# Patient Record
Sex: FEMALE | Race: WHITE | NOT HISPANIC OR LATINO | Employment: FULL TIME | URBAN - METROPOLITAN AREA
[De-identification: names, ages, dates, MRNs, and addresses within clinical notes are randomized per-mention and may not be internally consistent; named-entity substitution may affect disease eponyms.]

---

## 2017-08-09 ENCOUNTER — HOSPITAL ENCOUNTER (OUTPATIENT)
Dept: GENERAL RADIOLOGY | Facility: HOSPITAL | Age: 57
Discharge: HOME | End: 2017-08-09
Admitting: NURSE PRACTITIONER

## 2017-08-09 ENCOUNTER — OFFICE VISIT (OUTPATIENT)
Dept: INTERNAL MEDICINE | Facility: CLINIC | Age: 57
End: 2017-08-09

## 2017-08-09 VITALS
WEIGHT: 171 LBS | DIASTOLIC BLOOD PRESSURE: 82 MMHG | BODY MASS INDEX: 32.28 KG/M2 | HEART RATE: 89 BPM | SYSTOLIC BLOOD PRESSURE: 124 MMHG | OXYGEN SATURATION: 97 % | HEIGHT: 61 IN

## 2017-08-09 DIAGNOSIS — M54.12 CERVICAL RADICULOPATHY: ICD-10-CM

## 2017-08-09 DIAGNOSIS — M77.8 TENDONITIS OF ELBOW, LEFT: ICD-10-CM

## 2017-08-09 DIAGNOSIS — M54.2 CERVICAL PAIN (NECK): Primary | ICD-10-CM

## 2017-08-09 DIAGNOSIS — E78.5 HYPERLIPIDEMIA, UNSPECIFIED HYPERLIPIDEMIA TYPE: ICD-10-CM

## 2017-08-09 DIAGNOSIS — M50.30 DEGENERATIVE CERVICAL DISC: Primary | ICD-10-CM

## 2017-08-09 DIAGNOSIS — M54.2 CERVICAL PAIN (NECK): ICD-10-CM

## 2017-08-09 LAB
ALBUMIN SERPL-MCNC: 4.8 G/DL (ref 3.5–5.2)
ALBUMIN/GLOB SERPL: 2 G/DL
ALP SERPL-CCNC: 70 U/L (ref 40–129)
ALT SERPL-CCNC: 19 U/L (ref 5–33)
AST SERPL-CCNC: 28 U/L (ref 5–32)
BASOPHILS # BLD AUTO: 0.03 10*3/MM3 (ref 0–0.2)
BASOPHILS NFR BLD AUTO: 0.6 % (ref 0–2)
BILIRUB SERPL-MCNC: 0.4 MG/DL (ref 0.2–1.2)
BUN SERPL-MCNC: 9 MG/DL (ref 6–20)
BUN/CREAT SERPL: 15.3 (ref 7–25)
CALCIUM SERPL-MCNC: 9.9 MG/DL (ref 8.6–10.5)
CHLORIDE SERPL-SCNC: 102 MMOL/L (ref 98–107)
CHOLEST SERPL-MCNC: 193 MG/DL (ref 0–200)
CHOLEST/HDLC SERPL: 2.08 {RATIO}
CO2 SERPL-SCNC: 25.5 MMOL/L (ref 22–29)
CREAT SERPL-MCNC: 0.59 MG/DL (ref 0.57–1)
EOSINOPHIL # BLD AUTO: 0.35 10*3/MM3 (ref 0.1–0.3)
EOSINOPHIL NFR BLD AUTO: 6.5 % (ref 0–4)
ERYTHROCYTE [DISTWIDTH] IN BLOOD BY AUTOMATED COUNT: 12.5 % (ref 11.5–14.5)
GLOBULIN SER CALC-MCNC: 2.4 GM/DL
GLUCOSE SERPL-MCNC: 101 MG/DL (ref 65–99)
HCT VFR BLD AUTO: 41.5 % (ref 37–47)
HDLC SERPL-MCNC: 93 MG/DL (ref 40–60)
HGB BLD-MCNC: 13.7 G/DL (ref 12–16)
IMM GRANULOCYTES # BLD: 0.01 10*3/MM3 (ref 0–0.03)
IMM GRANULOCYTES NFR BLD: 0.2 % (ref 0–0.5)
LDLC SERPL CALC-MCNC: 89 MG/DL (ref 0–100)
LYMPHOCYTES # BLD AUTO: 1.56 10*3/MM3 (ref 0.6–4.8)
LYMPHOCYTES NFR BLD AUTO: 28.8 % (ref 20–45)
MCH RBC QN AUTO: 31.5 PG (ref 27–31)
MCHC RBC AUTO-ENTMCNC: 33 G/DL (ref 31–37)
MCV RBC AUTO: 95.4 FL (ref 81–99)
MONOCYTES # BLD AUTO: 0.46 10*3/MM3 (ref 0–1)
MONOCYTES NFR BLD AUTO: 8.5 % (ref 3–8)
NEUTROPHILS # BLD AUTO: 3 10*3/MM3 (ref 1.5–8.3)
NEUTROPHILS NFR BLD AUTO: 55.4 % (ref 45–70)
NRBC BLD AUTO-RTO: 0 /100 WBC (ref 0–0)
PLATELET # BLD AUTO: 333 10*3/MM3 (ref 140–500)
POTASSIUM SERPL-SCNC: 4.3 MMOL/L (ref 3.5–5.2)
PROT SERPL-MCNC: 7.2 G/DL (ref 6–8.5)
RBC # BLD AUTO: 4.35 10*6/MM3 (ref 4.2–5.4)
SODIUM SERPL-SCNC: 140 MMOL/L (ref 136–145)
T4 SERPL-MCNC: 6.83 MCG/DL (ref 4.5–11.7)
TRIGL SERPL-MCNC: 56 MG/DL (ref 0–150)
TSH SERPL DL<=0.005 MIU/L-ACNC: 1.63 MIU/ML (ref 0.27–4.2)
VIT B12 SERPL-MCNC: 1526 PG/ML (ref 211–946)
VLDLC SERPL CALC-MCNC: 11.2 MG/DL (ref 7–27)
WBC # BLD AUTO: 5.41 10*3/MM3 (ref 4.8–10.8)

## 2017-08-09 PROCEDURE — 99214 OFFICE O/P EST MOD 30 MIN: CPT | Performed by: NURSE PRACTITIONER

## 2017-08-09 PROCEDURE — 72040 X-RAY EXAM NECK SPINE 2-3 VW: CPT

## 2017-08-09 RX ORDER — MELOXICAM 15 MG/1
15 TABLET ORAL DAILY
Qty: 30 TABLET | Refills: 1 | Status: SHIPPED | OUTPATIENT
Start: 2017-08-09 | End: 2018-03-30

## 2017-08-09 NOTE — PROGRESS NOTES
"Chief Complaint   Patient presents with   • Arm Pain       Subjective   Kellen Henriquez is a 56 y.o. female is being seen for an acute appointment for right arm pain for 2 years. Started with right arm numbness at night that has progressively worsened. Now, with aching pain, \"like on fire and burning.\" Pain starts in neck and radiates into first 3 fingers. Numbness is  Worse after working, hand dangling. She is banding steel coils at work, and she operates hand machines.  Better is walking around, and shaking hand. She is not taking anything.     She has a history of tendonitis in both elbows, and she also has had cortisone injections in the past. She is having left elbow pain again since starting about 1 week ago. She would like an injection.      She also has elevated cholesterol and is fasting today.     History of Present Illness     Current Outpatient Prescriptions on File Prior to Visit   Medication Sig Dispense Refill   • aspirin 325 MG tablet Take by mouth.     • Garcinia Cambogia-Chromium 500-200 MG-MCG tablet Take by mouth.     • hydrocortisone (ANUSOL-HC) 2.5 % rectal cream 2 (two) times a day.     • meloxicam (MOBIC) 15 MG tablet Take 1 tablet (15 mg total) by mouth daily. 30 tablet 1   • tolterodine LA (DETROL LA) 4 MG 24 hr capsule Take 1 capsule (4 mg total) by mouth daily. 30 capsule 11     No current facility-administered medications on file prior to visit.        The following portions of the patient's history were reviewed and updated as appropriate: allergies, current medications, past family history, past medical history, past social history, past surgical history and problem list.    Review of Systems   Constitutional: Negative.    HENT: Negative.    Eyes: Negative.    Respiratory: Negative.  Negative for apnea and chest tightness.    Cardiovascular: Negative.  Negative for chest pain and leg swelling.   Gastrointestinal: Negative.  Negative for abdominal distention and abdominal pain. "   Endocrine: Negative.    Musculoskeletal: Positive for arthralgias, back pain, gait problem, myalgias and neck pain.   Allergic/Immunologic: Negative.  Negative for environmental allergies and food allergies.   Neurological: Positive for numbness.   Hematological: Negative.    Psychiatric/Behavioral: Negative.  Negative for agitation and behavioral problems.       Objective   Physical Exam   Constitutional: She is oriented to person, place, and time. She appears well-developed and well-nourished.   HENT:   Head: Normocephalic.   Right Ear: External ear normal.   Left Ear: External ear normal.   Neck: Neck supple.   Cardiovascular: Normal rate, regular rhythm and normal heart sounds.    No murmur heard.  Pulmonary/Chest: Effort normal and breath sounds normal. No respiratory distress.   Musculoskeletal: She exhibits no edema.        Right wrist: She exhibits normal range of motion and no tenderness.        Cervical back: She exhibits decreased range of motion, tenderness (C5-6) and pain (In a C3-4 distribtution). She exhibits no swelling, no edema and no deformity.        Left forearm: She exhibits tenderness (lateral epicondyle).   Negative Tinel's and Phalen signs. Radial pulse palpable wit normal cap refill< 3 secs.    Neurological: She is alert and oriented to person, place, and time.   Skin: No erythema.   Psychiatric: She has a normal mood and affect. Her behavior is normal.   Vitals reviewed.      Assessment/Plan   Kellen was seen today for arm pain.    Diagnoses and all orders for this visit:    Cervical pain (neck)  -     XR Spine Cervical 2 or 3 View; Future  -     meloxicam (MOBIC) 15 MG tablet; Take 1 tablet by mouth Daily.    Cervical radiculopathy  -     XR Spine Cervical 2 or 3 View; Future  -     meloxicam (MOBIC) 15 MG tablet; Take 1 tablet by mouth Daily.    Tendonitis of elbow, left  -     Ambulatory Referral to Orthopedic Surgery       Diagnosis Plan   1. Cervical pain (neck)  XR Spine Cervical 2  or 3 View    meloxicam (MOBIC) 15 MG tablet   2. Cervical radiculopathy  XR Spine Cervical 2 or 3 View    meloxicam (MOBIC) 15 MG tablet    CBC & Differential    Vitamin B12   3. Tendonitis of elbow, left  Ambulatory Referral to Orthopedic Surgery   4. Hyperlipidemia, unspecified hyperlipidemia type  Comprehensive metabolic panel    Conv Lipid Panel w/ Chol/HDL Ratio    CBC & Differential    T4 & TSH ([facility])     It appears to be cervical region of radiculopathy from repetitive movement at work. Will eval for Deg disc in cervical area. I have asked her to wear a wrist brace at night.

## 2017-08-11 ENCOUNTER — TELEPHONE (OUTPATIENT)
Dept: INTERNAL MEDICINE | Facility: CLINIC | Age: 57
End: 2017-08-11

## 2017-08-11 NOTE — TELEPHONE ENCOUNTER
----- Message from HECTOR Stockton sent at 8/10/2017  8:12 AM EDT -----  Labs are doing well. B12 is high range. Glucose is 1 point above normal, so monitor sweets in diet. Cholesterol has improved since last check.   ----- Message -----     From: Interface, Reflab Results In     Sent: 8/9/2017   4:17 PM       To: HECTOR Stockton

## 2017-11-02 ENCOUNTER — TRANSCRIBE ORDERS (OUTPATIENT)
Dept: ADMINISTRATIVE | Facility: HOSPITAL | Age: 57
End: 2017-11-02

## 2017-11-02 DIAGNOSIS — Z12.31 VISIT FOR SCREENING MAMMOGRAM: Primary | ICD-10-CM

## 2017-11-20 ENCOUNTER — HOSPITAL ENCOUNTER (OUTPATIENT)
Dept: MAMMOGRAPHY | Facility: HOSPITAL | Age: 57
Discharge: HOME | End: 2017-11-20
Admitting: NURSE PRACTITIONER

## 2017-11-20 ENCOUNTER — PROCEDURE VISIT (OUTPATIENT)
Dept: INTERNAL MEDICINE | Facility: CLINIC | Age: 57
End: 2017-11-20

## 2017-11-20 VITALS
TEMPERATURE: 98.2 F | BODY MASS INDEX: 31.34 KG/M2 | DIASTOLIC BLOOD PRESSURE: 88 MMHG | WEIGHT: 166 LBS | OXYGEN SATURATION: 95 % | RESPIRATION RATE: 16 BRPM | HEIGHT: 61 IN | HEART RATE: 77 BPM | SYSTOLIC BLOOD PRESSURE: 130 MMHG

## 2017-11-20 DIAGNOSIS — R82.998 LEUKOCYTES IN URINE: ICD-10-CM

## 2017-11-20 DIAGNOSIS — R92.8 ABNORMAL MAMMOGRAM OF RIGHT BREAST: ICD-10-CM

## 2017-11-20 DIAGNOSIS — E55.9 VITAMIN D DEFICIENCY: ICD-10-CM

## 2017-11-20 DIAGNOSIS — Z23 FLU VACCINE NEED: ICD-10-CM

## 2017-11-20 DIAGNOSIS — Z00.00 HEALTHCARE MAINTENANCE: ICD-10-CM

## 2017-11-20 DIAGNOSIS — K64.4 EXTERNAL HEMORRHOIDS: ICD-10-CM

## 2017-11-20 DIAGNOSIS — Z86.19 HISTORY OF HPV INFECTION: ICD-10-CM

## 2017-11-20 DIAGNOSIS — Z12.83 SKIN CANCER SCREENING: ICD-10-CM

## 2017-11-20 DIAGNOSIS — N31.8 HYPERTONICITY OF BLADDER: Primary | ICD-10-CM

## 2017-11-20 DIAGNOSIS — M50.30 DEGENERATIVE CERVICAL DISC: ICD-10-CM

## 2017-11-20 DIAGNOSIS — Z12.31 VISIT FOR SCREENING MAMMOGRAM: ICD-10-CM

## 2017-11-20 DIAGNOSIS — Z11.59 NEED FOR HEPATITIS C SCREENING TEST: ICD-10-CM

## 2017-11-20 PROBLEM — N81.10 FEMALE CYSTOCELE: Status: ACTIVE | Noted: 2017-11-20

## 2017-11-20 PROBLEM — M54.2 CERVICAL PAIN (NECK): Status: RESOLVED | Noted: 2017-08-09 | Resolved: 2017-11-20

## 2017-11-20 LAB
BILIRUB BLD-MCNC: NEGATIVE MG/DL
CLARITY, POC: ABNORMAL
COLOR UR: YELLOW
GLUCOSE UR STRIP-MCNC: NEGATIVE MG/DL
KETONES UR QL: NEGATIVE
LEUKOCYTE EST, POC: ABNORMAL
NITRITE UR-MCNC: NEGATIVE MG/ML
PH UR: 5.5 [PH] (ref 5–8)
PROT UR STRIP-MCNC: NEGATIVE MG/DL
RBC # UR STRIP: ABNORMAL /UL
SP GR UR: 1 (ref 1–1.03)
UROBILINOGEN UR QL: NORMAL

## 2017-11-20 PROCEDURE — G0202 SCR MAMMO BI INCL CAD: HCPCS

## 2017-11-20 PROCEDURE — 99396 PREV VISIT EST AGE 40-64: CPT | Performed by: NURSE PRACTITIONER

## 2017-11-20 PROCEDURE — 90471 IMMUNIZATION ADMIN: CPT | Performed by: NURSE PRACTITIONER

## 2017-11-20 PROCEDURE — 77063 BREAST TOMOSYNTHESIS BI: CPT

## 2017-11-20 PROCEDURE — 90686 IIV4 VACC NO PRSV 0.5 ML IM: CPT | Performed by: NURSE PRACTITIONER

## 2017-11-20 NOTE — PROGRESS NOTES
"Gynecologic Exam (c/o prolaspe symptoms) and Hand Pain        Kellen Henriquez is being seen for a Complete physical exam. Her last physical was 12-2-2015. She is  and works full time in [employer]. She has health history of OAB, hemorrhoids, and vitamin D Def. Colonoscopy was  completed 8-9-2013. Last labs reviewed from 8-9-2017. She exercises 0 times a week. She does not/never used tobacco. She drinks no alcoholic drinks per week.     Reproductive Health: Her Last Pap smear was 12-2-2015. LMP was age 52 years old.   DEXA scan complete never. Last Mammogram was today, and she will need an US.    She is complaining of right hand numbness and tingling for 6 months. She proceeded to see ortho and go to PT. The PT would not help, and so she got an MRI ordered by Richard Carcamo. Pain mostly at night in right hand , rated a \"20\" of 10. Worse with driving or hand above head.     She is also complanioning of pressure in vaginal area of and on with urge incontinence.     History of Present Illness     The following portions of the patient's history were reviewed and updated as appropriate: allergies, current medications, past family history, past medical history, past social history, past surgical history and problem list.    Review of Systems   Constitutional: Negative.    HENT: Negative for congestion and dental problem.    Eyes: Negative.    Respiratory: Negative.    Cardiovascular: Negative.  Negative for chest pain, palpitations and leg swelling.   Gastrointestinal: Negative.  Negative for abdominal distention and abdominal pain.   Endocrine: Negative.    Genitourinary: Positive for frequency, pelvic pain and urgency. Negative for difficulty urinating, enuresis and flank pain.   Musculoskeletal: Positive for arthralgias, back pain, neck pain and neck stiffness. Negative for gait problem and myalgias.   Skin: Positive for color change.   Allergic/Immunologic: Negative.  Negative for environmental allergies. "   Neurological: Positive for numbness.   Hematological: Negative.    Psychiatric/Behavioral: Negative.        Objective       General Appearance:    Alert, cooperative, no distress, appears stated age   Head:    Normocephalic, without obvious abnormality, atraumatic   Eyes:    PERRL, conjunctiva/corneas clear, EOM's intact, fundi     benign, both eyes   Ears:    Normal TM's and external ear canals, both ears   Nose:   Nares normal, septum midline, mucosa normal, no drainage     or sinus tenderness   Throat:   Lips, mucosa, and tongue normal; teeth and gums normal   Neck:   Supple, symmetrical, trachea midline, no adenopathy;     thyroid:  no enlargement/tenderness/nodules; no carotid    bruit or JVD   Back:     Symmetric, no curvature, ROM normal, no CVA tenderness   Lungs:     Clear to auscultation bilaterally, respirations unlabored   Chest Wall:    No tenderness or deformity    Heart:    Regular rate and rhythm, S1 and S2 normal, no murmur, rub    or gallop   Breast Exam:    No tenderness, masses, or nipple abnormality   Abdomen:     Soft, non-tender, bowel sounds active all four quadrants,     no masses, no organomegaly   Genitalia:    Normal female without lesion, discharge or tenderness   Rectal:    Normal tone, no masses or tenderness   Extremities:   Extremities normal, atraumatic, no cyanosis or edema   Pulses:   2+ and symmetric all extremities   Skin:   Skin color, texture, turgor normal, 1mm nonhealing lesion right nasal bridge   Lymph nodes:   Cervical, supraclavicular, and axillary nodes normal   Neurologic:   CNII-XII intact, normal strength, sensation and reflexes     throughout               Assessment/Plan   Kellen was seen today for gynecologic exam and hand pain.    Diagnoses and all orders for this visit:    Hypertonicity of bladder    Healthcare maintenance    External hemorrhoids    History of HPV infection    Degenerative cervical disc  -     Ambulatory Referral to Hospital Pain Management  Department    Need for hepatitis C screening test  -     Hepatitis C Antibody    Vitamin D deficiency  -     Vitamin D 1,25 Dihydroxy    Abnormal mammogram of right breast  -     US breast right complete    Skin cancer screening  -     Ambulatory Referral to Dermatology        She will follow up at next scheduled appointment.

## 2017-11-20 NOTE — PATIENT INSTRUCTIONS
Degenerative Disk Disease  Degenerative disk disease is a condition caused by the changes that occur in spinal disks as you grow older. Spinal disks are soft and compressible disks located between the bones of your spine (vertebrae). These disks act like shock absorbers. Degenerative disk disease can affect the whole spine. However, the neck and lower back are most commonly affected. Many changes can occur in the spinal disks with aging, such as:  · The spinal disks may dry and shrink.  · Small tears may occur in the tough, outer covering of the disk (annulus).  · The disk space may become smaller due to loss of water.  · Abnormal growths in the bone (spurs) may occur. This can put pressure on the nerve roots exiting the spinal canal, causing pain.  · The spinal canal may become narrowed.  RISK FACTORS   · Being overweight.  · Having a family history of degenerative disk disease.  · Smoking.  · There is increased risk if you are doing heavy lifting or have a sudden injury.  SIGNS AND SYMPTOMS   Symptoms vary from person to person and may include:  · Pain that varies in intensity. Some people have no pain, while others have severe pain. The location of the pain depends on the part of your backbone that is affected.  ¨ You will have neck or arm pain if a disk in the neck area is affected.  ¨ You will have pain in your back, buttocks, or legs if a disk in the lower back is affected.  · Pain that becomes worse while bending, reaching up, or with twisting movements.  · Pain that may start gradually and then get worse as time passes. It may also start after a major or minor injury.  · Numbness or tingling in the arms or legs.  DIAGNOSIS   Your health care provider will ask you about your symptoms and about activities or habits that may cause the pain. He or she may also ask about any injuries, diseases, or treatments you have had. Your health care provider will examine you to check for the range of movement that is  possible in the affected area, to check for strength in your extremities, and to check for sensation in the areas of the arms and legs supplied by different nerve roots. You may also have:   · An X-ray of the spine.  · Other imaging tests, such as MRI.  TREATMENT   Your health care provider will advise you on the best plan for treatment. Treatment may include:  · Medicines.  · Rehabilitation exercises.  HOME CARE INSTRUCTIONS   · Follow proper lifting and walking techniques as advised by your health care provider.  · Maintain good posture.  · Exercise regularly as advised by your health care provider.  · Perform relaxation exercises.  · Change your sitting, standing, and sleeping habits as advised by your health care provider.  · Change positions frequently.  · Lose weight or maintain a healthy weight as advised by your health care provider.  · Do not use any tobacco products, including cigarettes, chewing tobacco, or electronic cigarettes. If you need help quitting, ask your health care provider.  · Wear supportive footwear.  · Take medicines only as directed by your health care provider.  SEEK MEDICAL CARE IF:   · Your pain does not go away within 1-4 weeks.  · You have significant appetite or weight loss.  SEEK IMMEDIATE MEDICAL CARE IF:   · Your pain is severe.  · You notice weakness in your arms, hands, or legs.  · You begin to lose control of your bladder or bowel movements.  · You have fevers or night sweats.  MAKE SURE YOU:   · Understand these instructions.  · Will watch your condition.  · Will get help right away if you are not doing well or get worse.     This information is not intended to replace advice given to you by your health care provider. Make sure you discuss any questions you have with your health care provider.     Document Released: 10/14/2008 Document Revised: 01/08/2016 Document Reviewed: 04/21/2015  [employer] Interactive Patient Education ©2017 [employer]

## 2017-11-22 LAB
1,25(OH)2D3 SERPL-MCNC: 57.9 PG/ML (ref 19.9–79.3)
HCV AB S/CO SERPL IA: <0.1 S/CO RATIO (ref 0–0.9)

## 2017-11-24 ENCOUNTER — HOSPITAL ENCOUNTER (OUTPATIENT)
Dept: ULTRASOUND IMAGING | Facility: HOSPITAL | Age: 57
Discharge: HOME | End: 2017-11-24
Admitting: NURSE PRACTITIONER

## 2017-11-24 LAB
BACTERIA UR CULT: ABNORMAL
BACTERIA UR CULT: ABNORMAL
CONV .: NORMAL
CYTOLOGIST CVX/VAG CYTO: NORMAL
CYTOLOGY CVX/VAG DOC THIN PREP: NORMAL
DX ICD CODE: NORMAL
HIV 1 & 2 AB SER-IMP: NORMAL
OTHER ANTIBIOTIC SUSC ISLT: ABNORMAL
OTHER STN SPEC: NORMAL
PATH REPORT.FINAL DX SPEC: NORMAL
STAT OF ADQ CVX/VAG CYTO-IMP: NORMAL

## 2017-11-24 PROCEDURE — 76641 ULTRASOUND BREAST COMPLETE: CPT

## 2017-11-27 ENCOUNTER — TELEPHONE (OUTPATIENT)
Dept: INTERNAL MEDICINE | Facility: CLINIC | Age: 57
End: 2017-11-27

## 2017-11-27 RX ORDER — CIPROFLOXACIN 250 MG/1
250 TABLET, FILM COATED ORAL 2 TIMES DAILY
Qty: 6 TABLET | Refills: 0 | Status: SHIPPED | OUTPATIENT
Start: 2017-11-27 | End: 2018-03-05

## 2017-11-27 NOTE — TELEPHONE ENCOUNTER
LVM for patient to return call. I went ahead and sent in RX abx but advised patient to return call.     ----- Message from HECTOR Stockton sent at 2017  8:00 AM EST -----  Her Pap results are negative, but her urine is showing a UTI. Start Cipro 250mg Si po Q12hrs for 3 days Disp #6

## 2017-12-05 ENCOUNTER — TELEPHONE (OUTPATIENT)
Dept: INTERNAL MEDICINE | Facility: CLINIC | Age: 57
End: 2017-12-05

## 2017-12-05 NOTE — TELEPHONE ENCOUNTER
LM on patient personal VM as per below (attempted to reach patient several times with no answer).  Explained that cyst has a benign appearance at this time, but that it is imperative that it be rechecked in 6 months.    ----- Message from HECTOR Stockton sent at 11/27/2017  8:12 AM EST -----  Breast US is showing a right breast cyst. Repeat breast US in 6 months to check for stability.

## 2017-12-05 NOTE — TELEPHONE ENCOUNTER
LM on patient VM as per below.  Also reminded patient that we sent in an antibiotic for tx of her UTI on 11/27/2017 (see Olivia's 11/27 note).    ----- Message from HECTOR Stockton sent at 11/22/2017  2:52 PM EST -----  Hep C screen is negative and Vitamin D is normal level.

## 2018-03-05 ENCOUNTER — OFFICE VISIT (OUTPATIENT)
Dept: INTERNAL MEDICINE | Facility: CLINIC | Age: 58
End: 2018-03-05

## 2018-03-05 VITALS
SYSTOLIC BLOOD PRESSURE: 112 MMHG | HEIGHT: 61 IN | RESPIRATION RATE: 16 BRPM | WEIGHT: 168 LBS | OXYGEN SATURATION: 98 % | BODY MASS INDEX: 31.72 KG/M2 | DIASTOLIC BLOOD PRESSURE: 78 MMHG | HEART RATE: 83 BPM | TEMPERATURE: 98.5 F

## 2018-03-05 DIAGNOSIS — M50.30 DEGENERATIVE DISC DISEASE, CERVICAL: Primary | ICD-10-CM

## 2018-03-05 PROCEDURE — 99213 OFFICE O/P EST LOW 20 MIN: CPT | Performed by: NURSE PRACTITIONER

## 2018-03-05 NOTE — PROGRESS NOTES
Chief Complaint   Patient presents with   • Follow-up   • Back Pain       Subjective     Kellen Henriquez is a 57 y.o. female being seen for a follow up appointment today regarding neck pain. She was in the office for a physical in November, and she was complaining of right hand numbness and tingling for 6 months. She was seen by ortho and PT. A cervical XR showed DDD. She was referred to pain management for cervical DDD. She never heard back from [facility] pain management regarding epidurals, so she started seeing the chiropractor. Pain in neck and back rated 6-7 of 10. Asscoited numbness in right hand. Denies hand weakness. She had an MRI cervical spine at [facility]- showing spinal stenosis and DDD. Pain is worse at night, described as burning, tingling pain. She is on motrin as needed.       History of Present Illness     No Known Allergies      Current Outpatient Prescriptions:   •  B Complex Vitamins (VITAMIN B COMPLEX PO), Take  by mouth., Disp: , Rfl:   •  Multiple Vitamins-Minerals (MULTIVITAMIN ADULT PO), Take  by mouth., Disp: , Rfl:   •  aspirin 325 MG tablet, Take by mouth., Disp: , Rfl:   •  Garcinia Cambogia-Chromium 500-200 MG-MCG tablet, Take by mouth., Disp: , Rfl:   •  hydrocortisone (ANUSOL-HC) 2.5 % rectal cream, 2 (two) times a day., Disp: , Rfl:   •  meloxicam (MOBIC) 15 MG tablet, Take 1 tablet by mouth Daily., Disp: 30 tablet, Rfl: 1  •  tolterodine LA (DETROL LA) 4 MG 24 hr capsule, Take 1 capsule (4 mg total) by mouth daily., Disp: 30 capsule, Rfl: 11    The following portions of the patient's history were reviewed and updated as appropriate: allergies, current medications, past family history, past medical history, past social history, past surgical history and problem list.    Review of Systems   Constitutional: Negative.    HENT: Negative.    Eyes: Negative.    Respiratory: Negative.    Gastrointestinal: Negative.    Endocrine: Negative.    Genitourinary: Negative.     Musculoskeletal: Positive for arthralgias, neck pain and neck stiffness.   Allergic/Immunologic: Negative.    Neurological: Positive for numbness.   Hematological: Negative.    Psychiatric/Behavioral: Negative.        Assessment     Physical Exam   Constitutional: She appears well-developed and well-nourished.   Neck: Neck supple. No thyromegaly present.   Cardiovascular: Normal rate, regular rhythm and normal heart sounds.    No murmur heard.  Pulmonary/Chest: Effort normal and breath sounds normal.   Musculoskeletal:        Cervical back: She exhibits tenderness (C 6-7) and pain (in a C 5-6 radicular pattern). She exhibits normal range of motion, no bony tenderness, no swelling, no edema, no spasm and normal pulse (hand  equal and strong. radial pulses 2 plus).   Psychiatric: She has a normal mood and affect. Her behavior is normal.   Vitals reviewed.      Plan      Diagnosis Plan   1. Degenerative disc disease, cervical  Ambulatory Referral to Hospital Pain Management Department     Discussed treatment options for DDD. Will start with epidural treatments.     Follow up in 2 months for cervical disc disease.

## 2018-03-05 NOTE — PATIENT INSTRUCTIONS
Degenerative Disk Disease  Degenerative disk disease is a condition caused by the changes that occur in spinal disks as you grow older. Spinal disks are soft and compressible disks located between the bones of your spine (vertebrae). These disks act like shock absorbers. Degenerative disk disease can affect the whole spine. However, the neck and lower back are most commonly affected. Many changes can occur in the spinal disks with aging, such as:  · The spinal disks may dry and shrink.  · Small tears may occur in the tough, outer covering of the disk (annulus).  · The disk space may become smaller due to loss of water.  · Abnormal growths in the bone (spurs) may occur. This can put pressure on the nerve roots exiting the spinal canal, causing pain.  · The spinal canal may become narrowed.  What increases the risk?  · Being overweight.  · Having a family history of degenerative disk disease.  · Smoking.  · There is increased risk if you are doing heavy lifting or have a sudden injury.  What are the signs or symptoms?  Symptoms vary from person to person and may include:  · Pain that varies in intensity. Some people have no pain, while others have severe pain. The location of the pain depends on the part of your backbone that is affected.  ¨ You will have neck or arm pain if a disk in the neck area is affected.  ¨ You will have pain in your back, buttocks, or legs if a disk in the lower back is affected.  · Pain that becomes worse while bending, reaching up, or with twisting movements.  · Pain that may start gradually and then get worse as time passes. It may also start after a major or minor injury.  · Numbness or tingling in the arms or legs.  How is this diagnosed?  Your health care provider will ask you about your symptoms and about activities or habits that may cause the pain. He or she may also ask about any injuries, diseases, or treatments you have had. Your health care provider will examine you to check for  the range of movement that is possible in the affected area, to check for strength in your extremities, and to check for sensation in the areas of the arms and legs supplied by different nerve roots. You may also have:  · An X-ray of the spine.  · Other imaging tests, such as MRI.  How is this treated?  Your health care provider will advise you on the best plan for treatment. Treatment may include:  · Medicines.  · Rehabilitation exercises.  Follow these instructions at home:  · Follow proper lifting and walking techniques as advised by your health care provider.  · Maintain good posture.  · Exercise regularly as advised by your health care provider.  · Perform relaxation exercises.  · Change your sitting, standing, and sleeping habits as advised by your health care provider.  · Change positions frequently.  · Lose weight or maintain a healthy weight as advised by your health care provider.  · Do not use any tobacco products, including cigarettes, chewing tobacco, or electronic cigarettes. If you need help quitting, ask your health care provider.  · Wear supportive footwear.  · Take medicines only as directed by your health care provider.  Contact a health care provider if:  · Your pain does not go away within 1-4 weeks.  · You have significant appetite or weight loss.  Get help right away if:  · Your pain is severe.  · You notice weakness in your arms, hands, or legs.  · You begin to lose control of your bladder or bowel movements.  · You have fevers or night sweats.  This information is not intended to replace advice given to you by your health care provider. Make sure you discuss any questions you have with your health care provider.  Document Released: 10/14/2008 Document Revised: 05/25/2017 Document Reviewed: 04/21/2015  [employer] Interactive Patient Education © 2017 [employer]

## 2018-03-12 DIAGNOSIS — M50.30 DDD (DEGENERATIVE DISC DISEASE), CERVICAL: Primary | ICD-10-CM

## 2018-03-30 ENCOUNTER — OFFICE VISIT (OUTPATIENT)
Dept: PAIN MEDICINE | Facility: CLINIC | Age: 58
End: 2018-03-30

## 2018-03-30 ENCOUNTER — RESULTS ENCOUNTER (OUTPATIENT)
Dept: PAIN MEDICINE | Facility: CLINIC | Age: 58
End: 2018-03-30

## 2018-03-30 VITALS
HEART RATE: 89 BPM | DIASTOLIC BLOOD PRESSURE: 84 MMHG | HEIGHT: 61 IN | SYSTOLIC BLOOD PRESSURE: 130 MMHG | RESPIRATION RATE: 15 BRPM | BODY MASS INDEX: 32.32 KG/M2 | WEIGHT: 171.2 LBS | OXYGEN SATURATION: 97 % | TEMPERATURE: 98.4 F

## 2018-03-30 DIAGNOSIS — M50.30 DEGENERATIVE CERVICAL DISC: ICD-10-CM

## 2018-03-30 DIAGNOSIS — M54.12 CERVICAL RADICULOPATHY: ICD-10-CM

## 2018-03-30 DIAGNOSIS — M50.30 DEGENERATIVE DISC DISEASE, CERVICAL: Primary | ICD-10-CM

## 2018-03-30 DIAGNOSIS — M50.30 DEGENERATIVE DISC DISEASE, CERVICAL: ICD-10-CM

## 2018-03-30 LAB
POC AMPHETAMINES: NEGATIVE
POC BARBITURATES: NEGATIVE
POC BENZODIAZEPHINES: NEGATIVE
POC COCAINE: NEGATIVE
POC METHADONE: NEGATIVE
POC METHAMPHETAMINE SCREEN URINE: NEGATIVE
POC OPIATES: NEGATIVE
POC OXYCODONE: NEGATIVE
POC PHENCYCLIDINE: NEGATIVE
POC PROPOXYPHENE: NEGATIVE
POC THC: NEGATIVE
POC TRICYCLIC ANTIDEPRESSANTS: NEGATIVE

## 2018-03-30 PROCEDURE — 80305 DRUG TEST PRSMV DIR OPT OBS: CPT | Performed by: PAIN MEDICINE

## 2018-03-30 PROCEDURE — 99204 OFFICE O/P NEW MOD 45 MIN: CPT | Performed by: PAIN MEDICINE

## 2018-03-30 RX ORDER — NAPROXEN 250 MG/1
250 TABLET ORAL 2 TIMES DAILY PRN
COMMUNITY
End: 2018-05-25 | Stop reason: DRUGHIGH

## 2018-03-30 RX ORDER — GABAPENTIN 300 MG/1
CAPSULE ORAL
Qty: 90 CAPSULE | Refills: 3 | Status: SHIPPED | OUTPATIENT
Start: 2018-03-30 | End: 2018-05-25

## 2018-03-30 NOTE — PROGRESS NOTES
CHIEF COMPLAINT: Neck Pain and Back Pain    Kellen Henriquez is a 57 y.o. female.   He was referred here by Dr. Garcia. He presents to the office for evaluation and treatment of Neck Pain and Back Pain    HPI  Neck Pain and Back Pain  Started years ago. States she mentioned it to Flaquita over a year ago. It started out small and got worse. What bothers her the most at night is her right hand when she sleeps. States it feels like her thumb was smashed and her hand tingles along with burning and numbness. During the day she can help ease it by posture or sitting a certain way. States she was going to see PM back in November of 2017 but [facility] did not call her back so Flaquita referred her here. She has been to the chiropractor which helped ease her hand pain however it was not improving past a certain point so she stopped seeing him in January.     Pt gets more intense pain at night when she is laying down to sleep. States if she sleeps 4 hours it is a good night.     The patient states their pain is a 5 on a scale of 1-10.  The patient describes this pain as constant stabbing between shoulder blades. Worse pain is her burning in her right hand that is worse at night. Hand pain starts at right wrist and radiates through out entire right hand. The pain in the center of her neck is less severe than the stabbing pain in between shoulder blades. This painful problem is aggravated by physical activity and turning head and is alleviated by pain medication and relaxation.  She has history of low back and left hip pain (MRI lumbar spine in 2016).    Currently still working. [employer].     Past pain medications:   n/a Narcotics   Meloxicam (did not help)    Current pain medications:   Naproxen- sometimes at night before bed and sometimes in AM before starting work    Past therapies:  Physical Therapy: yes-October  Chiropractor: yes- November through January  Massage Therapy: no  TENS: no  Neck or back surgery: no  Past  pain management: no    Previous Injections: in lower back back in 2774-7912 -pt states she was working a strenuous job and this relieved the pain... Also in elbows cortisone  Effect of Injection (%): 100%  Length of Relief: pain did not last after pt left her job that was causing the pain     PEG Assessment   What number best describes your pain on average in the past week? 8  What number best describes how, during the past week, pain has interfered with your enjoyment of life? 4  What number best describes how, during the past week, pain has interfered with your general activity? 4      Current Outpatient Prescriptions:   •  aspirin 325 MG tablet, Take  by mouth As Needed., Disp: , Rfl:   •  B Complex Vitamins (VITAMIN B COMPLEX PO), Take  by mouth., Disp: , Rfl:   •  Multiple Vitamins-Minerals (MULTIVITAMIN ADULT PO), Take  by mouth., Disp: , Rfl:   •  naproxen (NAPROSYN) 250 MG tablet, Take 250 mg by mouth 2 (Two) Times a Day As Needed., Disp: , Rfl:   •  SAFFLOWER OIL PO, Take  by mouth., Disp: , Rfl:   •  gabapentin (NEURONTIN) 300 MG capsule, Take 1 cap PO QHS x 7 days; then increase to 1 cap BID x 7 days; then increase to 1 cap TID, Disp: 90 capsule, Rfl: 3    REVIEW OF PERTINENT MEDICAL DATA  Chart reviewed and summarization of all medical records up to new patient visit performed.  Referral to pain management but did not reutrn her call, referral here. Trial of NSAIDS.     IMAGING  MRI C-spine 2016 (under medica tab in chart)      X-ray C-spine 8-9-17:  IMPRESSION:  1. Multilevel degenerative changes of the cervical spine. No acute  findings.     I personally reviewed the images of cervical xray while patient was in the office.  Findings discussed with patient.      PFSH:  The following portions of the patient's history were reviewed and updated as appropriate: problem list, past medical history, past surgery history, social history, family history, medications, and allergies    Review of Systems  "  Constitutional: Negative for chills and fever.   Respiratory: Negative for apnea, cough, shortness of breath and wheezing.    Cardiovascular: Negative for chest pain.   Gastrointestinal: Negative for constipation and diarrhea.   Genitourinary: Negative for difficulty urinating.   Musculoskeletal: Positive for back pain and neck pain.   Allergic/Immunologic: Negative for immunocompromised state.   Neurological: Positive for dizziness (states the last two weeks when she gets out of bed), weakness (has a difficult time bending hand at times) and numbness. Negative for light-headedness and headaches.   Hematological: Does not bruise/bleed easily.   Psychiatric/Behavioral: Positive for sleep disturbance. Negative for agitation, confusion, hallucinations and suicidal ideas.   All other systems reviewed and are negative.      Vitals:    03/30/18 1036   BP: 130/84   Pulse: 89   Resp: 15   Temp: 98.4 °F (36.9 °C)   SpO2: 97%   Weight: 77.7 kg (171 lb 3.2 oz)   Height: 154.9 cm (60.98\")   PainSc:   5   PainLoc: Back  Comment: in between shoulder blades       Physical Exam   Constitutional: She appears well-developed and well-nourished. No distress.   HENT:   Head: Normocephalic and atraumatic.   Nose: Nose normal.   Mouth/Throat: Oropharynx is clear and moist.   Eyes: Conjunctivae and EOM are normal.   Neck: Normal range of motion. Neck supple.   Cardiovascular: Normal rate, regular rhythm and normal heart sounds.    Pulmonary/Chest: Effort normal and breath sounds normal. No stridor. No respiratory distress.   Abdominal: Soft. Bowel sounds are normal. She exhibits no distension. There is no tenderness.   Musculoskeletal:        Cervical back: She exhibits decreased range of motion, tenderness and pain.        Thoracic back: She exhibits decreased range of motion, tenderness and pain.   Neurological: She is alert. She has normal strength. No cranial nerve deficit or sensory deficit.   Skin: Skin is warm and dry. No rash " noted. She is not diaphoretic.   Psychiatric: She has a normal mood and affect. Her speech is normal and behavior is normal.   Nursing note and vitals reviewed.    Ortho Exam  Neurologic Exam     Mental Status   Speech: speech is normal     Cranial Nerves     CN III, IV, VI   Extraocular motions are normal.     Motor Exam     Strength   Strength 5/5 throughout.     Sensory Exam   Right arm light touch: decreased on 1st 3 metacarpals.      Lab Results   Component Value Date    POCMETH Negative 03/30/2018    POCAMPHET Negative 03/30/2018    POCBARBITUR Negative 03/30/2018    POCBENZO Negative 03/30/2018    POCCOCAINE Negative 03/30/2018    POCMETHADO Negative 03/30/2018    POCOPIATES Negative 03/30/2018    POCOXYCODO Negative 03/30/2018    POCPHENCYC Negative 03/30/2018    POCPROPOXY Negative 03/30/2018    POCTHC Negative 03/30/2018    POCTRICYC Negative 03/30/2018       Comments: Reviewed POC today      Date of last KAROLYN reviewed : 03/30/18   Comments: Consistent         Kellen was seen today for neck pain and back pain.    Diagnoses and all orders for this visit:    Degenerative disc disease, cervical  -     gabapentin (NEURONTIN) 300 MG capsule; Take 1 cap PO QHS x 7 days; then increase to 1 cap BID x 7 days; then increase to 1 cap TID  -     Case Request  -     Urine Drug Screen Confirmation - Urine, Urine, Clean Catch; Future  -     POC Urine Drug Screen, Triage    Degenerative cervical disc  -     gabapentin (NEURONTIN) 300 MG capsule; Take 1 cap PO QHS x 7 days; then increase to 1 cap BID x 7 days; then increase to 1 cap TID  -     Case Request    Cervical radiculopathy  -     gabapentin (NEURONTIN) 300 MG capsule; Take 1 cap PO QHS x 7 days; then increase to 1 cap BID x 7 days; then increase to 1 cap TID  -     Case Request      Requested Prescriptions     Signed Prescriptions Disp Refills   • gabapentin (NEURONTIN) 300 MG capsule 90 capsule 3     Sig: Take 1 cap PO QHS x 7 days; then increase to 1 cap BID x  7 days; then increase to 1 cap TID     - Imaging reviewed with patient.    - Start Gabapentin 300 mg at bedtime and titrate up to three times per day as prescribed. Patient instructed regarding side effects and the need to avoid driving until they know how the medication changes affect them.     - will titrate up in future if tolerated.   - Discussed with the patient regarding the etiology of their pain. Informed them that they would likely benefit from a C7/T1 Cervical epidural steroid injection.  The procedure was described in detail and the risks, benefits and alternatives were discussed with the patient (including but not limited to: bleeding, infection, nerve damage, worsening of pain, inability to perform injection, paralysis, seizures, and death) who agreed to proceed.     - Will perform two injections approx 1 month apart.     - if no improvement with 2 injections, will order new imaging.   - wants to avoid narcotic medication.     Wt Readings from Last 3 Encounters:   03/30/18 77.7 kg (171 lb 3.2 oz)   03/05/18 76.2 kg (168 lb)   11/20/17 75.3 kg (166 lb)     Body mass index is 32.36 kg/m². Patient counseled on the importance of weight loss to help with overall health and pain control. Patient instructed to attempt weight loss.   Plan: Calorie counting cut out extra servings and reduce fast food intake    Follow-up in 2 months.       Jacki Ayala MD  Pain Management

## 2018-04-03 DIAGNOSIS — N60.01 CYST OF RIGHT BREAST: Primary | ICD-10-CM

## 2018-04-18 ENCOUNTER — DOCUMENTATION (OUTPATIENT)
Dept: PAIN MEDICINE | Facility: CLINIC | Age: 58
End: 2018-04-18

## 2018-04-18 ENCOUNTER — OUTSIDE FACILITY SERVICE (OUTPATIENT)
Dept: PAIN MEDICINE | Facility: CLINIC | Age: 58
End: 2018-04-18

## 2018-04-18 PROCEDURE — 62321 NJX INTERLAMINAR CRV/THRC: CPT | Performed by: PAIN MEDICINE

## 2018-04-18 NOTE — PROGRESS NOTES
Cervical Epidural Steroid Injection @ C7-T1  [facility]    PREOPERATIVE DIAGNOSIS:  Cervical Radiculopathy, Cervical Degenerative Disc Disease and Chronic Neck Pain  POSTOPERATIVE DIAGNOSIS:  Same as preop diagnosis    PROCEDURE:   Cervical Epidural Steroid Injection, Therapeutic Translaminar Injection, with epidurogram, at  C7-T1 level    PRE-PROCEDURE DISCUSSION WITH PATIENT:    Risks and complications were discussed with the patient prior to starting the procedure and informed consent was obtained.  We discussed various topics including but not limited to bleeding, infection, injury, paralysis, nerve injury, dural puncture, coma, death, worsening of clinical picture, lack of pain relief, and postprocedural soreness.    SURGEON:  Jacki Ayala MD    REASON FOR PROCEDURE:   Diagnostic injection at this level is needed and Degenerative changes are noted in the area.    SEDATION:  Patient declined administration of moderate sedation    ANESTHETIC:  injectable LAs: Marcaine 0.25%  STEROID:   Dexamethasone 8mg    DESCRIPTON OF PROCEDURE:  After obtaining informed consent, I.V. was started in the preop area.   The patient was taken to the operating room and placed in the prone position.  EKG, blood pressure, and pulse oximeter were monitored throughout, and sedation was provided as needed by the RN under my guidance. All pressure points were well padded.  The cervicothoracic spine area was prepped with Chloraprep and draped in a sterile fashion.  Under fluoroscopic guidance, the aforementioned interlaminar space was identified. Skin and subcutaneous tissues were anesthetized with 1% lidocaine in the middle of the space. A 22-gauge Tuohy needle was introduced through the skin and advanced to this interlaminar space and into the epidural space under fluoroscopic guidance and verified with loss-of-resistance technique to air.  After confirming the position of the needle with the fluoroscope with all  the views, and after aspiration was confirmed negative for blood and CSF, 1.5 mL of Omnipaque was injected.  After seeing appropriate epidurogram with lateral and PA views, a total of 3 cc solution was injected, consisting of 1cc of local anesthetic as above, with normal saline and injectable steroid as above.     ESTIMATED BLOOD LOSS:  <5 mL  SPECIMENS:  None    COMPLICATIONS:     No complications were noted., There was no indication of vascular uptake on live injection of contrast dye. and There was no indication of intrathecal uptake on live injection of contrast dye.    TOLERANCE & DISCHARGE CONDITION:    The patient tolerated the procedure well.  The patient was transported to the recovery area without difficulties.  The patient was discharged to home under the care of family in stable and satisfactory condition.    PLAN OF CARE:  1. The patient was given our standard instruction sheet.        2.   The patient will Repeat injection 4 wks.  3.    The patient will resume all medications as per the medication reconciliation sheet.

## 2018-05-25 ENCOUNTER — OFFICE VISIT (OUTPATIENT)
Dept: INTERNAL MEDICINE | Facility: CLINIC | Age: 58
End: 2018-05-25

## 2018-05-25 ENCOUNTER — HOSPITAL ENCOUNTER (OUTPATIENT)
Dept: ULTRASOUND IMAGING | Facility: HOSPITAL | Age: 58
Discharge: HOME | End: 2018-05-25
Admitting: NURSE PRACTITIONER

## 2018-05-25 ENCOUNTER — OFFICE VISIT (OUTPATIENT)
Dept: PAIN MEDICINE | Facility: CLINIC | Age: 58
End: 2018-05-25

## 2018-05-25 VITALS
HEIGHT: 61 IN | TEMPERATURE: 98.2 F | RESPIRATION RATE: 18 BRPM | OXYGEN SATURATION: 94 % | DIASTOLIC BLOOD PRESSURE: 81 MMHG | WEIGHT: 173.6 LBS | BODY MASS INDEX: 32.77 KG/M2 | HEART RATE: 82 BPM | SYSTOLIC BLOOD PRESSURE: 123 MMHG

## 2018-05-25 VITALS
SYSTOLIC BLOOD PRESSURE: 126 MMHG | BODY MASS INDEX: 32.66 KG/M2 | WEIGHT: 173 LBS | HEIGHT: 61 IN | RESPIRATION RATE: 16 BRPM | OXYGEN SATURATION: 98 % | DIASTOLIC BLOOD PRESSURE: 74 MMHG | TEMPERATURE: 98.3 F | HEART RATE: 66 BPM

## 2018-05-25 DIAGNOSIS — M54.12 CERVICAL RADICULOPATHY: ICD-10-CM

## 2018-05-25 DIAGNOSIS — M50.30 DDD (DEGENERATIVE DISC DISEASE), CERVICAL: Primary | ICD-10-CM

## 2018-05-25 DIAGNOSIS — M50.30 DEGENERATIVE DISC DISEASE, CERVICAL: ICD-10-CM

## 2018-05-25 DIAGNOSIS — N60.01 CYST OF RIGHT BREAST: ICD-10-CM

## 2018-05-25 DIAGNOSIS — M50.30 DEGENERATIVE CERVICAL DISC: ICD-10-CM

## 2018-05-25 PROCEDURE — 76641 ULTRASOUND BREAST COMPLETE: CPT

## 2018-05-25 PROCEDURE — 99214 OFFICE O/P EST MOD 30 MIN: CPT | Performed by: PAIN MEDICINE

## 2018-05-25 PROCEDURE — 99213 OFFICE O/P EST LOW 20 MIN: CPT | Performed by: NURSE PRACTITIONER

## 2018-05-25 RX ORDER — NAPROXEN 500 MG/1
500 TABLET ORAL 2 TIMES DAILY WITH MEALS
Qty: 60 TABLET | Refills: 0 | Status: SHIPPED | OUTPATIENT
Start: 2018-05-25 | End: 2019-08-12

## 2018-05-25 RX ORDER — LIDOCAINE 50 MG/G
1 PATCH TOPICAL EVERY 24 HOURS
Qty: 30 PATCH | Refills: 3 | Status: SHIPPED | OUTPATIENT
Start: 2018-05-25 | End: 2019-08-12

## 2018-05-25 RX ORDER — GABAPENTIN 300 MG/1
600 CAPSULE ORAL 3 TIMES DAILY
Qty: 90 CAPSULE | Refills: 3 | Status: ON HOLD | OUTPATIENT
Start: 2018-05-25 | End: 2018-08-13

## 2018-05-25 NOTE — PROGRESS NOTES
CHIEF COMPLAINT: Back Pain and Neck Pain    HPI  Kellen Henriquez is a 57 y.o. female.  She is here to follow up for Back Pain and Neck Pain    Kellen Henriquez is a 57 y.o. female  who presents to the office for follow-up.  She completed a Cervical Epidural Steroid Injection @ C7-T1 on 4/18/18. Patient reports no relief from the procedure. Still has debilitating right hand numbness/tingling that interferes with ADLs. Right hand pain starts at right wrist and radiates through out entire right hand but is worse with moving neck or laying down/how she positions her head/neck at night to sleep.   The patient states their pain is a 8 on a scale of 1-10.  The patient describes this pain as constant numbness, tingling and stinging.  The pain is located in right arm and right hand.   Also has back pain in between shoulder blades but this is not as severe as RUE pain. Back is a constant dull, ache pain, worse with sitting or physical activity.     Past pain medications:   n/a Narcotics   Meloxicam (did not help)     Current pain medications:   Naproxen- sometimes at night before bed and sometimes in AM before starting work     Past therapies:  Physical Therapy: yes-October  Chiropractor: yes- November through January  Massage Therapy: no  TENS: no  Neck or back surgery: no  Past pain management: no     Previous Injection: Cervical Epidural Steroid Injection @ C7-T1 on 4/18/18  Effect of Injection (%): none    Previous Injections: in lower back back in 5646-1845 -pt states she was working a strenuous job and this relieved the pain... Also in elbows cortisone  Effect of Injection (%): 100%  Length of Relief: pain did not last after pt left her job that was causing the pain    PEG Assessment   What number best describes your pain on average in the past week? 8  What number best describes how, during the past week, pain has interfered with your enjoyment of life? 7  What number best describes how, during the past week, pain has  "interfered with your general activity? 7      Current Outpatient Prescriptions:   •  aspirin 325 MG tablet, Take  by mouth As Needed., Disp: , Rfl:   •  B Complex Vitamins (VITAMIN B COMPLEX PO), Take  by mouth., Disp: , Rfl:   •  naproxen (NAPROSYN) 500 MG tablet, Take 1 tablet by mouth 2 (Two) Times a Day With Meals., Disp: 60 tablet, Rfl: 0  •  SAFFLOWER OIL PO, Take  by mouth., Disp: , Rfl:   •  gabapentin (NEURONTIN) 300 MG capsule, Take 2 capsules by mouth 3 (Three) Times a Day., Disp: 90 capsule, Rfl: 3  •  lidocaine (LIDODERM) 5 %, Place 1 patch on the skin Daily. Remove & Discard patch within 12 hours or as directed by MD, Disp: 30 patch, Rfl: 3    IMAGING  MRI C-spine 2016 (under medica tab in chart)       X-ray C-spine 8-9-17:  IMPRESSION:  1. Multilevel degenerative changes of the cervical spine. No acute  findings.     PFSH:  The following portions of the patient's history were reviewed and updated as appropriate: problem list, past medical history, past surgery history, social history, family history, medications, and allergies    Review of Systems   Constitutional: Positive for fatigue.   HENT: Negative for congestion.    Eyes: Negative for visual disturbance.   Respiratory: Negative for cough, shortness of breath and wheezing.    Cardiovascular: Negative.    Gastrointestinal: Negative for constipation and diarrhea.   Genitourinary: Negative for difficulty urinating.   Musculoskeletal: Positive for back pain and neck pain.   Neurological: Positive for numbness (bilateral hands). Negative for weakness.   Psychiatric/Behavioral: Positive for sleep disturbance. Negative for suicidal ideas. The patient is not nervous/anxious.    All other systems reviewed and are negative.      Vitals:    05/25/18 1258   BP: 123/81   Pulse: 82   Resp: 18   Temp: 98.2 °F (36.8 °C)   SpO2: 94%   Weight: 78.7 kg (173 lb 9.6 oz)   Height: 154.9 cm (61\")   PainSc: 8  Comment: neck pain 5/10   PainLoc: Back       Physical Exam "   Constitutional: She appears well-developed and well-nourished. No distress.   HENT:   Head: Normocephalic and atraumatic.   Nose: Nose normal.   Mouth/Throat: Oropharynx is clear and moist.   Eyes: Conjunctivae and EOM are normal.   Neck: Normal range of motion. Neck supple.   Pulmonary/Chest: No stridor. No respiratory distress.   Musculoskeletal:        Cervical back: She exhibits decreased range of motion, tenderness and pain.        Thoracic back: She exhibits decreased range of motion, tenderness and pain.   Neurological: She is alert. She has normal strength. No cranial nerve deficit or sensory deficit.   Skin: Skin is warm and dry. No rash noted. She is not diaphoretic.   Psychiatric: She has a normal mood and affect. Her speech is normal and behavior is normal.   Nursing note and vitals reviewed.    Ortho Exam  Neurologic Exam     Mental Status   Speech: speech is normal     Cranial Nerves     CN III, IV, VI   Extraocular motions are normal.     Motor Exam     Strength   Strength 5/5 throughout.     Sensory Exam   Right arm light touch: decreased from elbow  Left arm light touch: normal      Lab Results   Component Value Date    POCMETH Negative 03/30/2018    POCAMPHET Negative 03/30/2018    POCBARBITUR Negative 03/30/2018    POCBENZO Negative 03/30/2018    POCCOCAINE Negative 03/30/2018    POCMETHADO Negative 03/30/2018    POCOPIATES Negative 03/30/2018    POCOXYCODO Negative 03/30/2018    POCPHENCYC Negative 03/30/2018    POCPROPOXY Negative 03/30/2018    POCTHC Negative 03/30/2018    POCTRICYC Negative 03/30/2018     Last UDS results reviewed: 05/25/18   Last UDS: 3/30/2018  Comments: Consistent     Date of last KAROLYN reviewed : 05/25/18   Comments: Consistent         Kellen was seen today for back pain and neck pain.    Diagnoses and all orders for this visit:    DDD (degenerative disc disease), cervical  -     lidocaine (LIDODERM) 5 %; Place 1 patch on the skin Daily. Remove & Discard patch within 12  hours or as directed by MD    Degenerative disc disease, cervical  -     lidocaine (LIDODERM) 5 %; Place 1 patch on the skin Daily. Remove & Discard patch within 12 hours or as directed by MD  -     gabapentin (NEURONTIN) 300 MG capsule; Take 2 capsules by mouth 3 (Three) Times a Day.    Degenerative cervical disc  -     lidocaine (LIDODERM) 5 %; Place 1 patch on the skin Daily. Remove & Discard patch within 12 hours or as directed by MD  -     gabapentin (NEURONTIN) 300 MG capsule; Take 2 capsules by mouth 3 (Three) Times a Day.    Cervical radiculopathy  -     lidocaine (LIDODERM) 5 %; Place 1 patch on the skin Daily. Remove & Discard patch within 12 hours or as directed by MD  -     gabapentin (NEURONTIN) 300 MG capsule; Take 2 capsules by mouth 3 (Three) Times a Day.      Requested Prescriptions     Signed Prescriptions Disp Refills   • lidocaine (LIDODERM) 5 % 30 patch 3     Sig: Place 1 patch on the skin Daily. Remove & Discard patch within 12 hours or as directed by MD   • gabapentin (NEURONTIN) 300 MG capsule 90 capsule 3     Sig: Take 2 capsules by mouth 3 (Three) Times a Day.     - no relief from JEB which is unfortunate. Will not repeat at this time. Given pain radicular symptoms, I do not think facet injections would help with this even though facet changes seen on old imaging.   - next step is updating imaging. PCP ordered updated cervical MRI today.   - depending on results, can also order EMG nerve conduction test to see if right hand numbness/tingling is coming from neck vs carpel tunnel. Sounds more likely from neck so will wait until MRI results before ordering.   - start lidoderm patches to neck prn.   - increase gabapentin from 300 mg to 600 mg tid. Patient instructed regarding side effects and the need to avoid driving until they know how the medication changes affect them.    - can always increase to max dose in future if tolerated.   - agree with EVAN evaluation given minimal benefit from  JEB. PCP placed today with MRI.    - This was a 25 minute face to face visit with 15 minutes spent counseling on medication, usage and treatment plan.      Wt Readings from Last 3 Encounters:   05/25/18 78.7 kg (173 lb 9.6 oz)   05/25/18 78.5 kg (173 lb)   03/30/18 77.7 kg (171 lb 3.2 oz)     Body mass index is 32.8 kg/m². Patient counseled on the importance of weight loss to help with overall health and pain control. Patient instructed to attempt weight loss.   Plan: Calorie counting  reduce portion size and cut out extra servings    Follow-up as needed for pain     Jacki Ayala MD  Pain Management

## 2018-05-25 NOTE — PROGRESS NOTES
"Chief Complaint   Patient presents with   • Follow-up   • Back Pain       Subjective     Kellen Henriquez is a 57 y.o. female being seen for a follow up appointment today regarding  Cervical DDD. She has had 1 epidural since last appointment. She is having pain between shoulder blades up to neck. Pain is rated 4-5 of 10, elevating to 10 of 10 if she is busy at work.\" Worse at night and excess movement. Pain radiates to right hand with hand numbness. She is taking Naprosyn with little relief. She tried and failed gabapentin.        History of Present Illness     No Known Allergies      Current Outpatient Prescriptions:   •  aspirin 325 MG tablet, Take  by mouth As Needed., Disp: , Rfl:   •  B Complex Vitamins (VITAMIN B COMPLEX PO), Take  by mouth., Disp: , Rfl:   •  SAFFLOWER OIL PO, Take  by mouth., Disp: , Rfl:   •  naproxen (NAPROSYN) 250 MG tablet, Take 250 mg by mouth 2 (Two) Times a Day As Needed., Disp: , Rfl:     The following portions of the patient's history were reviewed and updated as appropriate: allergies, current medications, past family history, past medical history, past social history, past surgical history and problem list.    Review of Systems   HENT: Negative.    Eyes: Negative.    Respiratory: Negative.  Negative for apnea.    Cardiovascular: Negative.  Negative for chest pain and leg swelling.   Gastrointestinal: Negative.    Endocrine: Negative.    Genitourinary: Negative.    Musculoskeletal: Positive for arthralgias, back pain, neck pain and neck stiffness.   Allergic/Immunologic: Negative.    Neurological: Positive for weakness.   Hematological: Negative.    Psychiatric/Behavioral: Negative.        Assessment     Physical Exam   Constitutional: She is oriented to person, place, and time. She appears well-developed and well-nourished.   Neck: No edema present.   Decreased ROM in neck. Poor flexion and extension. Pain at C 5-6 in a radicular pattern.    Cardiovascular: Normal rate, regular " rhythm and normal heart sounds.    No murmur heard.  Pulmonary/Chest: Effort normal and breath sounds normal. No respiratory distress. She has no wheezes.   Neurological: She is alert and oriented to person, place, and time. She has normal strength and normal reflexes.   Psychiatric: Her behavior is normal. Her mood appears anxious.   Vitals reviewed.      Plan     Her cervical Xr was reviewed with her from 8-9-2017.    Kellen was seen today for follow-up and back pain.    Diagnoses and all orders for this visit:    DDD (degenerative disc disease), cervical  -     MRI Cervical Spine Without Contrast; Future  -     Ambulatory Referral to Neurosurgery  -     naproxen (NAPROSYN) 500 MG tablet; Take 1 tablet by mouth 2 (Two) Times a Day With Meals.    Cervical radiculopathy  -     MRI Cervical Spine Without Contrast; Future  -     Ambulatory Referral to Neurosurgery  -     naproxen (NAPROSYN) 500 MG tablet; Take 1 tablet by mouth 2 (Two) Times a Day With Meals.      Will call with MRI results. Follow up with neurosurgery

## 2018-05-29 ENCOUNTER — PRIOR AUTHORIZATION (OUTPATIENT)
Dept: PAIN MEDICINE | Facility: CLINIC | Age: 58
End: 2018-05-29

## 2018-05-29 NOTE — TELEPHONE ENCOUNTER
Sent PA for Lidocaine Patches to [employer] through [employer] (Key # [id number]) and waiting on response

## 2018-05-31 NOTE — TELEPHONE ENCOUNTER
Lidocaine denied - Passport will only cover with a diagnosis of postherpetic neuralgia or diabetic peripheral neuropathy. Denial scanned in

## 2018-06-08 ENCOUNTER — HOSPITAL ENCOUNTER (OUTPATIENT)
Dept: MRI IMAGING | Facility: HOSPITAL | Age: 58
Discharge: HOME | End: 2018-06-08
Admitting: NURSE PRACTITIONER

## 2018-06-08 ENCOUNTER — APPOINTMENT (OUTPATIENT)
Dept: MRI IMAGING | Facility: HOSPITAL | Age: 58
End: 2018-06-08

## 2018-06-08 DIAGNOSIS — M50.30 DDD (DEGENERATIVE DISC DISEASE), CERVICAL: ICD-10-CM

## 2018-06-08 DIAGNOSIS — M54.12 CERVICAL RADICULOPATHY: ICD-10-CM

## 2018-06-08 PROCEDURE — 72141 MRI NECK SPINE W/O DYE: CPT

## 2018-06-12 DIAGNOSIS — M54.12 CERVICAL RADICULOPATHY: ICD-10-CM

## 2018-06-12 DIAGNOSIS — M50.30 DEGENERATIVE DISC DISEASE, CERVICAL: ICD-10-CM

## 2018-06-12 DIAGNOSIS — M50.30 DEGENERATIVE CERVICAL DISC: ICD-10-CM

## 2018-06-12 NOTE — TELEPHONE ENCOUNTER
Medication Refill Request    Date of phone call: 6-12-18    Medication being requested: Gabapentin 300 mg capsule sig: Take 2 tablets by mouth 3 times a day   Qty: 90    Date of last visit: 5-25-18    Date of last refill: 5-25-18    KAROLYN up to date?: 5-24-18    Next Follow up?: not scheduled yet    Any new pertinent information? (i.e, new medication allergies, new use of medications, change in patient's health or condition, non-compliance or inconsistency with prescribing agreement?): Pt states she discussed with Dr. Ayala that if the Gabapentin was not strong enough, she could increase the dose. (see last OV note) So with this refill, she is requesting an increase in dosage-sent to [facility].

## 2018-06-12 NOTE — TELEPHONE ENCOUNTER
Ok. Please call again tomorrow to determine how she is currently taking and what she wants called in. Thanks. hao

## 2018-06-14 RX ORDER — GABAPENTIN 300 MG/1
600 CAPSULE ORAL 3 TIMES DAILY
Qty: 90 CAPSULE | Refills: 3 | OUTPATIENT
Start: 2018-06-14

## 2018-06-14 RX ORDER — GABAPENTIN 600 MG/1
600 TABLET ORAL 3 TIMES DAILY
Qty: 90 TABLET | Refills: 1 | Status: SHIPPED | OUTPATIENT
Start: 2018-06-14 | End: 2019-08-12

## 2018-06-14 NOTE — TELEPHONE ENCOUNTER
I think the confusion is I increased her from 300 mg tid to 600 mg tid (but with 2 tablets of 300 mg). I mentioned if the 600 mg was an ok dose I could just send in the 600 mg tablets instead of her taking 2 tablets of the 300 mg.   It is unfortunate that we have tried multiple times to verify dose and can not get a hold of the patient. I went ahead and sent over the 600 mg tid. If wrong I'm sure we will hear from her.

## 2018-06-14 NOTE — TELEPHONE ENCOUNTER
Dr. Ayala,  I have tried calling pt multiple times and she is not answering to clarify if she was taking 300 mg TID or more than this. Do you know if this correct or the dosage was higher?  Senia

## 2018-07-18 ENCOUNTER — TELEPHONE (OUTPATIENT)
Dept: GASTROENTEROLOGY | Facility: CLINIC | Age: 58
End: 2018-07-18

## 2018-07-18 DIAGNOSIS — Z12.11 COLON CANCER SCREENING: Primary | ICD-10-CM

## 2018-07-30 PROBLEM — Z12.11 COLON CANCER SCREENING: Status: ACTIVE | Noted: 2018-07-30

## 2018-07-30 NOTE — TELEPHONE ENCOUNTER
Emailed instructions    Dr. Pretty Meeks   Date: 8/13/18________     Arrival Time: __12:00pm_______    Hospital:  [facility] ([address] [place] [address] [place] [address]) (back of hospital at the emergency room) or         Please call the office as soon as possible if you need to reschedule or cancel your procedure please give two weeks’ notice.  If you do not show up or frequently reschedule your procedure, your provider has the option of not rescheduling.    Stop the following medications 5 days prior to your procedure- check with the prescribing doctor prior to holding.  No Aspirin, Advil, Aleve, Motrin, IBU or anything listed on the back of this form.    If you are taking any medications on the attached list and/or pills that contain iron please stop them one week prior. Insulin will be addressed separately.    1.  your prescription AND a 10 oz. bottle of Magnesium Citrate (over the counter) as soon as possible. Do not wait until the day before in case of issues with cost or etc.    The day before your procedure  It is very important that you follow the instructions on this form and not on the prep you were prescribed.    You will be on a clear liquid diet only which may include coffee , tea, soft drinks, broth(chicken beef or vegetable), popsicles, Jell-O, Gatorade, juice (no orange, grapefruit or V-8).  ®No red or purple dyes and no dairy or non-dairy.    2. At 8:00 am drink the bottle of magnesium citrate.  Drink plenty of fluids in between    3. At    2:00 pm drink first dose or ½ of prep, mix as directed on container/box    Drink plenty of fluids between    4. At   10:00 pm      drink second dose or ½ of prep, mix as directed on container/box    5. If you start to get nauseous while drinking your prep try stepping away for 15-20 min. then resume again at a slower pace.    You may have clear liquids only up to 4 hours before your procedure.  No gum or candy!     You may only take your morning  prescription blood pressure (no diuretic combinations), breathing, seizure, psych or heart medications.     You will need a  to accompany you for your exam. The average time spent in our facility is around 2-3 hours.  You are not to drive, operate machinery or make legal decisions the remainder of the day following you procedure.    Please call Susan@ [phone] if you have questions about any of this information or your bowel movements are not a clearish liquid the morning of your procedure.  If it is after hours or the weekend and you need to reach the doctor or have questions for the office please call [phone].     It is your responsibility to check with your insurance company to determine benefits and out of pocket costs.      Avoid these medications 5-7 days prior to surgery  Please check with your prescribing doctor before stopping any medications  NSAIDS- Advil, Aleve, Motrin, Ibuprofen, Midol, Excedrin, Fiorinal, Mary Ann-Owosso  (Some cold medications may have these in them)    All herbal medications- iron, vitamin E, fish oil, decongestants (phenylephrine, pseudoephedrine), ginkgo, garlic, ginseng, Beatrice’s wart    Mobic (meloxicam), Celebrex, Diclofenac (Voltaren), Nambumetone (Relafen), Daypro, Naproxen, Sulindac, Indomethacin, Toradol, Feldine, Salsalate, Etodolac (Lodine),     Viagra, Cialis, Levitra    Aspirin, Plavix (clopidogrel), Effient, Pletal, Coumadin, Pradaxa, Brilinta, Ticlide, Eliquis, (Xaralto- 3 days)      Diet pills -Adipex (phentermine) -2 weeks prior

## 2018-08-10 ENCOUNTER — ANESTHESIA EVENT (OUTPATIENT)
Dept: PERIOP | Facility: HOSPITAL | Age: 58
End: 2018-08-10

## 2018-08-13 ENCOUNTER — ANESTHESIA (OUTPATIENT)
Dept: PERIOP | Facility: HOSPITAL | Age: 58
End: 2018-08-13

## 2018-08-13 ENCOUNTER — HOSPITAL ENCOUNTER (OUTPATIENT)
Facility: HOSPITAL | Age: 58
Setting detail: HOSPITAL OUTPATIENT SURGERY
Discharge: HOME | End: 2018-08-13
Attending: INTERNAL MEDICINE | Admitting: INTERNAL MEDICINE

## 2018-08-13 VITALS
WEIGHT: 173 LBS | RESPIRATION RATE: 16 BRPM | BODY MASS INDEX: 32.66 KG/M2 | TEMPERATURE: 97.8 F | SYSTOLIC BLOOD PRESSURE: 132 MMHG | DIASTOLIC BLOOD PRESSURE: 97 MMHG | HEIGHT: 61 IN | HEART RATE: 74 BPM | OXYGEN SATURATION: 96 %

## 2018-08-13 DIAGNOSIS — Z12.11 COLON CANCER SCREENING: ICD-10-CM

## 2018-08-13 PROCEDURE — 25010000002 PROPOFOL 10 MG/ML EMULSION: Performed by: NURSE ANESTHETIST, CERTIFIED REGISTERED

## 2018-08-13 PROCEDURE — 45380 COLONOSCOPY AND BIOPSY: CPT | Performed by: INTERNAL MEDICINE

## 2018-08-13 RX ORDER — SODIUM CHLORIDE 9 MG/ML
40 INJECTION, SOLUTION INTRAVENOUS AS NEEDED
Status: DISCONTINUED | OUTPATIENT
Start: 2018-08-13 | End: 2018-08-13 | Stop reason: HOSPADM

## 2018-08-13 RX ORDER — ONDANSETRON 2 MG/ML
4 INJECTION INTRAMUSCULAR; INTRAVENOUS ONCE AS NEEDED
Status: DISCONTINUED | OUTPATIENT
Start: 2018-08-13 | End: 2018-08-13 | Stop reason: HOSPADM

## 2018-08-13 RX ORDER — SODIUM CHLORIDE, SODIUM LACTATE, POTASSIUM CHLORIDE, CALCIUM CHLORIDE 600; 310; 30; 20 MG/100ML; MG/100ML; MG/100ML; MG/100ML
100 INJECTION, SOLUTION INTRAVENOUS CONTINUOUS
Status: DISCONTINUED | OUTPATIENT
Start: 2018-08-13 | End: 2018-08-13 | Stop reason: HOSPADM

## 2018-08-13 RX ORDER — LIDOCAINE HYDROCHLORIDE 20 MG/ML
INJECTION, SOLUTION INFILTRATION; PERINEURAL AS NEEDED
Status: DISCONTINUED | OUTPATIENT
Start: 2018-08-13 | End: 2018-08-13 | Stop reason: SURG

## 2018-08-13 RX ORDER — SODIUM CHLORIDE 0.9 % (FLUSH) 0.9 %
1-10 SYRINGE (ML) INJECTION AS NEEDED
Status: DISCONTINUED | OUTPATIENT
Start: 2018-08-13 | End: 2018-08-13 | Stop reason: HOSPADM

## 2018-08-13 RX ORDER — LIDOCAINE HYDROCHLORIDE 10 MG/ML
0.5 INJECTION, SOLUTION EPIDURAL; INFILTRATION; INTRACAUDAL; PERINEURAL ONCE AS NEEDED
Status: COMPLETED | OUTPATIENT
Start: 2018-08-13 | End: 2018-08-13

## 2018-08-13 RX ORDER — SODIUM CHLORIDE, SODIUM LACTATE, POTASSIUM CHLORIDE, CALCIUM CHLORIDE 600; 310; 30; 20 MG/100ML; MG/100ML; MG/100ML; MG/100ML
9 INJECTION, SOLUTION INTRAVENOUS CONTINUOUS
Status: DISCONTINUED | OUTPATIENT
Start: 2018-08-13 | End: 2018-08-13 | Stop reason: HOSPADM

## 2018-08-13 RX ORDER — MEPERIDINE HYDROCHLORIDE 25 MG/ML
12.5 INJECTION INTRAMUSCULAR; INTRAVENOUS; SUBCUTANEOUS
Status: DISCONTINUED | OUTPATIENT
Start: 2018-08-13 | End: 2018-08-13 | Stop reason: HOSPADM

## 2018-08-13 RX ORDER — PROPOFOL 10 MG/ML
VIAL (ML) INTRAVENOUS AS NEEDED
Status: DISCONTINUED | OUTPATIENT
Start: 2018-08-13 | End: 2018-08-13 | Stop reason: SURG

## 2018-08-13 RX ADMIN — LIDOCAINE HYDROCHLORIDE 100 MG: 20 INJECTION, SOLUTION INFILTRATION; PERINEURAL at 13:21

## 2018-08-13 RX ADMIN — PROPOFOL 50 MG: 10 INJECTION, EMULSION INTRAVENOUS at 13:24

## 2018-08-13 RX ADMIN — SODIUM CHLORIDE, POTASSIUM CHLORIDE, SODIUM LACTATE AND CALCIUM CHLORIDE 9 ML/HR: 600; 310; 30; 20 INJECTION, SOLUTION INTRAVENOUS at 12:34

## 2018-08-13 RX ADMIN — PROPOFOL 50 MG: 10 INJECTION, EMULSION INTRAVENOUS at 13:38

## 2018-08-13 RX ADMIN — PROPOFOL 100 MG: 10 INJECTION, EMULSION INTRAVENOUS at 13:22

## 2018-08-13 RX ADMIN — PROPOFOL 50 MG: 10 INJECTION, EMULSION INTRAVENOUS at 13:28

## 2018-08-13 RX ADMIN — PROPOFOL 50 MG: 10 INJECTION, EMULSION INTRAVENOUS at 13:42

## 2018-08-13 RX ADMIN — PROPOFOL 50 MG: 10 INJECTION, EMULSION INTRAVENOUS at 13:26

## 2018-08-13 RX ADMIN — LIDOCAINE HYDROCHLORIDE 0.5 ML: 10 INJECTION, SOLUTION EPIDURAL; INFILTRATION; INTRACAUDAL; PERINEURAL at 12:34

## 2018-08-13 RX ADMIN — PROPOFOL 50 MG: 10 INJECTION, EMULSION INTRAVENOUS at 13:32

## 2018-08-13 NOTE — ANESTHESIA POSTPROCEDURE EVALUATION
Patient: Kellen Henriquez    Procedure Summary     Date:  08/13/18 Room / Location:  [facility] ENDOSCOPY 2 /  [facility] OR    Anesthesia Start:  1316 Anesthesia Stop:  1404    Procedure:  COLONOSCOPY and polypectomy (N/A ) Diagnosis:       Colon cancer screening      (Colon cancer screening [Z12.11])    Surgeon:  Pretty Meeks MD Provider:  Gonzales Roberts CRNA    Anesthesia Type:  MAC ASA Status:  2          Anesthesia Type: MAC  Last vitals  BP   125/84 (08/13/18 1405)   Temp   97.8 °F (36.6 °C) (08/13/18 1221)   Pulse   74 (08/13/18 1405)   Resp   16 (08/13/18 1405)     SpO2   96 % (08/13/18 1405)     Post Anesthesia Care and Evaluation    Patient location during evaluation: bedside  Patient participation: complete - patient participated  Level of consciousness: awake and alert  Pain score: 0  Pain management: adequate  Airway patency: patent  Anesthetic complications: No anesthetic complications  PONV Status: none  Cardiovascular status: acceptable  Respiratory status: acceptable  Hydration status: acceptable

## 2018-08-13 NOTE — ANESTHESIA PREPROCEDURE EVALUATION
Anesthesia Evaluation     no history of anesthetic complications:  NPO Solid Status: > 8 hours  NPO Liquid Status: > 6 hours           Airway   Mallampati: II  TM distance: >3 FB  Neck ROM: full  Dental    (+) partials and lower dentures    Pulmonary - negative pulmonary ROS and normal exam    breath sounds clear to auscultation  Cardiovascular - normal exam  Exercise tolerance: good (4-7 METS)    Rhythm: regular  Rate: normal        Neuro/Psych  (+) numbness (better know),     GI/Hepatic/Renal/Endo - negative ROS     Musculoskeletal     (+) back pain, chronic pain, neck pain, radiculopathy (better) Right upper extremity  Abdominal  - normal exam   Substance History - negative use     OB/GYN          Other                        Anesthesia Plan    ASA 2     MAC     intravenous induction   Anesthetic plan and risks discussed with patient.

## 2018-08-13 NOTE — OP NOTE
Colonoscopy Note:    Indication:  History of colon polyps    Consent: Procedure colonoscopy was explained to the patient and detail including but not limited to the, patient of bleeding perforation and possible reactions to sedation.    Sedation: Sedation was provided by anesthesia.    Procedure:  After excellent sedation digital rectal examination is performed and a flexible colonoscope was inserted into the rectum passed to the cecum.  The cecum was identified by both the ileocecal valve and the appendiceal orifice.  The overall bowel preparation was fair to good.  Upon withdrawal scope careful examination mucosa was made.  She had some looping and redundancy in the colon such that external pressure was applied to successfully traversed scope into cecum.  Pertinent findings include a single cecal polyp 3 mm removed with forceps and a single transverse colon polyp that was about 3 mm removed with forceps scope was slowly withdrawn to the rectum and retroflex were internal hemorrhoids are noted.  Scope was straightened and withdrawn out of the patient with no immediate complications and she tolerated the procedure well.    Impression/Plan:  Colon polyps removed with forceps  We'll await biopsy results.  Repeat colonoscopy in 5 years will be recommended.

## 2018-08-13 NOTE — H&P
[facility]  Pre-procedure H&P     Chief Complaint: screening colon, history of polyps     Kellen Henriquez is a 57 y.o. female  who is referred by Pretty Meeks MD for a colonoscopy. She is an Asymptomatic person Age 50 years and older who has a history of previous adenomatous polyp.   She has had colonoscopy 5 years ago with abnormalities..     She denies any change in bowel function, melena, hematochezia or unexplained weight loss.     Medical History        Past Medical History:   Diagnosis Date   • H/O mammogram 2003     NORMAL   • History of mammogram 11/20/2017      jorge   • OAB (overactive bladder)     • Plantar fasciitis              Surgical History         Past Surgical History:   Procedure Laterality Date   • BREAST SURGERY   2013     calcium buildup removed and biopsied    • MOUTH SURGERY         TOOTH EXTRACTION   • PAP SMEAR   2002     NORMAL   • TUBAL ABDOMINAL LIGATION                      Family History   Problem Relation Age of Onset   • Breast cancer Mother     • Diabetes Mother     • Hyperlipidemia Mother     • Hypertension Mother     • Clotting disorder Father     • Diabetes Father     • Hyperlipidemia Father     • Hypertension Father     • Hypertension Brother     • Liver cancer Brother           Social History   Social History            Social History   • Marital status:        Spouse name: N/A   • Number of children: N/A   • Years of education: N/A          Occupational History   • Not on file.             Social History Main Topics   • Smoking status: Never Smoker   • Smokeless tobacco: Never Used   • Alcohol use Yes         Comment: 3 BOTTLES OF BEER PER DAY    • Drug use: No   • Sexual activity: Defer           Other Topics Concern   • Not on file          Social History Narrative     She is working at [employer] in [place].            No current facility-administered medications for this encounter.      No Known  Allergies        There were no vitals filed for this visit.     Physical Exam:              General: patient awake, alert and cooperative              Eyes: Normal lids and lashes, no scleral icterus              Neck: supple, normal ROM              Skin: warm and dry, not jaundiced              Cardiovascular: regular rhythm and rate, no murmurs auscultated              Pulm: clear to auscultation bilaterally, regular and unlabored              Abdomen: soft, nontender, nondistended; normal bowel sounds              Extremities: no rash or edema              Psychiatric: Normal mood and behavior        Assessment/Plan         Screening colonoscopy, history of polyps  Schedule for colonoscopy.        Indications, risks and procedure were discussed with the patient, including but not limited to, bleeding, infection, possibility of perforation and possible polypectomy. All of the patient's questions were answered, and signed informed consent was obtained and placed on the chart.        Pretty Meeks MD  [facility]  [unfilled]

## 2018-08-16 LAB
LAB AP CASE REPORT: NORMAL
PATH REPORT.FINAL DX SPEC: NORMAL

## 2018-11-26 ENCOUNTER — TRANSCRIBE ORDERS (OUTPATIENT)
Dept: ADMINISTRATIVE | Facility: HOSPITAL | Age: 58
End: 2018-11-26

## 2018-11-26 DIAGNOSIS — Z12.39 SCREENING BREAST EXAMINATION: Primary | ICD-10-CM

## 2018-12-10 ENCOUNTER — HOSPITAL ENCOUNTER (OUTPATIENT)
Dept: MAMMOGRAPHY | Facility: HOSPITAL | Age: 58
Discharge: HOME | End: 2018-12-10
Admitting: NURSE PRACTITIONER

## 2018-12-10 DIAGNOSIS — Z12.39 SCREENING BREAST EXAMINATION: ICD-10-CM

## 2018-12-10 PROCEDURE — 77067 SCR MAMMO BI INCL CAD: CPT

## 2018-12-10 PROCEDURE — 77063 BREAST TOMOSYNTHESIS BI: CPT

## 2019-08-12 ENCOUNTER — OFFICE VISIT (OUTPATIENT)
Dept: INTERNAL MEDICINE | Facility: CLINIC | Age: 59
End: 2019-08-12

## 2019-08-12 VITALS
DIASTOLIC BLOOD PRESSURE: 88 MMHG | RESPIRATION RATE: 16 BRPM | WEIGHT: 174 LBS | HEART RATE: 72 BPM | HEIGHT: 61 IN | OXYGEN SATURATION: 98 % | TEMPERATURE: 97.8 F | BODY MASS INDEX: 32.85 KG/M2 | SYSTOLIC BLOOD PRESSURE: 120 MMHG

## 2019-08-12 DIAGNOSIS — E78.5 HYPERLIPIDEMIA, UNSPECIFIED HYPERLIPIDEMIA TYPE: ICD-10-CM

## 2019-08-12 DIAGNOSIS — K13.79 MOUTH SORE: Primary | ICD-10-CM

## 2019-08-12 DIAGNOSIS — K12.0 ULCER APHTHOUS ORAL: Primary | ICD-10-CM

## 2019-08-12 DIAGNOSIS — Z00.00 HEALTHCARE MAINTENANCE: Primary | ICD-10-CM

## 2019-08-12 PROCEDURE — 99212 OFFICE O/P EST SF 10 MIN: CPT | Performed by: NURSE PRACTITIONER

## 2019-08-12 RX ORDER — VALACYCLOVIR HYDROCHLORIDE 1 G/1
2000 TABLET, FILM COATED ORAL 2 TIMES DAILY
Qty: 4 TABLET | Refills: 0 | Status: SHIPPED | OUTPATIENT
Start: 2019-08-12 | End: 2019-09-23

## 2019-08-12 NOTE — PATIENT INSTRUCTIONS
Oral Ulcers    Oral ulcers are sores inside the mouth or near the mouth. They may be called canker sores or cold sores, which are two types of oral ulcers. Many oral ulcers are harmless and go away on their own. In some cases, oral ulcers may require medical care to determine the cause and proper treatment.  What are the causes?  Common causes of this condition include:  · Viral, bacterial, or fungal infection.  · Emotional stress.  · Foods or chemicals that irritate the mouth.  · Injury or physical irritation of the mouth.  · Medicines.  · Allergies.  · Tobacco use.  Less common causes include:  · Skin disease.  · A type of herpes virus infection (herpes simplex or herpes zoster).  · Oral cancer.  In some cases, the cause of this condition may not be known.  What increases the risk?  Oral ulcers are more likely to develop in:  · People who wear dental braces, dentures, or retainers.  · People who do not keep their mouth clean or brush their teeth regularly.  · People who have sensitive skin.  · People who have conditions that affect the entire body (systemic conditions), such as immune disorders.  What are the signs or symptoms?  The main symptom of this condition is one or more oval-shaped or round ulcers that have red borders. Details about symptoms may vary depending on the cause.  · Location of the ulcers. They may be inside the mouth, on the gums, or on the insides of the lips or cheeks. They may also be on the lips or on skin that is near the mouth, such as the cheeks and chin.  · Pain. Ulcers can be painful and uncomfortable, or they can be painless.  · Appearance of the ulcers. They may look like red blisters and be filled with fluid, or they may be white or yellow patches.  · Frequency of outbreaks. Ulcers may go away permanently after one outbreak, or they may come back (recur) often or rarely.  How is this diagnosed?  This condition is diagnosed with a physical exam. Your health care provider may ask you  questions about your lifestyle and your medical history. You may have tests, including:  · Blood tests.  · Removal of a small number of cells from an ulcer to be examined under a microscope (biopsy).  How is this treated?  This condition is treated by managing any pain and discomfort, and by treating the underlying cause of the ulcers, if necessary. Usually, oral ulcers resolve by themselves in 1-2 weeks. You may be told to keep your mouth clean and avoid things that cause or irritate your ulcers. Your health care provider may prescribe medicines to reduce pain and discomfort or treat the underlying cause, if this applies.  Follow these instructions at home:  Lifestyle  · Follow instructions from your health care provider about eating or drinking restrictions.  ? Drink enough fluid to keep your urine clear or pale yellow.  ? Avoid foods and drinks that irritate your ulcers.  · Avoid tobacco products, including cigarettes, chewing tobacco, or e-cigarettes. If you need help quitting, ask your health care provider.  · Avoid excessive alcohol use.  Oral Hygiene    · Avoid physical or chemical irritants that may have caused the ulcers or made them worse, such as mouthwashes that contain alcohol (ethanol). If you wear dental braces, dentures, or retainers, work with your health care provider to make sure these devices are fitted correctly.  · Brush and floss your teeth at least once every day, and get regular dental cleanings and checkups.  · Gargle with a salt-water mixture 3-4 times per day or as told by your health care provider. To make a salt-water mixture, completely dissolve ½-1 tsp of salt in 1 cup of warm water.  General instructions  · Take over-the-counter and prescription medicines only as told by your health care provider.  · If you have pain, wrap a cold compress in a towel and gently press it against your face to help reduce pain.  · Keep all follow-up visits as told by your health care provider. This is  important.  Contact a health care provider if:  · You have pain that gets worse or does not get better with medicine.  · You have 4 or more ulcers at one time.  · You have a fever.  · You have new ulcers that look or feel different from other ulcers you have.  · You have inflammation in one eye or both eyes.  · You have ulcers that do not go away after 10 days.  · You develop new symptoms in your mouth, such as:  ? Bleeding or crusting around your lips or gums.  ? Tooth pain.  ? Difficulty swallowing.  · You develop symptoms on your skin or genitals, such as:  ? A rash or blisters.  ? Burning or itching sensations.  · Your ulcers begin or get worse after you start a new medicine.  Get help right away if:  · You have difficulty breathing.  · You have swelling in your face or neck.  · You have excessive bleeding from your mouth.  · You have severe pain.  This information is not intended to replace advice given to you by your health care provider. Make sure you discuss any questions you have with your health care provider.  Document Released: 01/25/2006 Document Revised: 05/22/2017 Document Reviewed: 05/04/2016  [employer] Interactive Patient Education © 2019 [employer]

## 2019-08-13 LAB
ALBUMIN SERPL-MCNC: 4.9 G/DL (ref 3.5–5.2)
ALBUMIN/GLOB SERPL: 2.2 G/DL
ALP SERPL-CCNC: 73 U/L (ref 39–117)
ALT SERPL-CCNC: 14 U/L (ref 1–33)
AST SERPL-CCNC: 20 U/L (ref 1–32)
BASOPHILS # BLD AUTO: 0.03 10*3/MM3 (ref 0–0.2)
BASOPHILS NFR BLD AUTO: 0.6 % (ref 0–1.5)
BILIRUB SERPL-MCNC: 0.3 MG/DL (ref 0.2–1.2)
BUN SERPL-MCNC: 12 MG/DL (ref 6–20)
BUN/CREAT SERPL: 19.4 (ref 7–25)
CALCIUM SERPL-MCNC: 9.4 MG/DL (ref 8.6–10.5)
CHLORIDE SERPL-SCNC: 101 MMOL/L (ref 98–107)
CHOLEST SERPL-MCNC: 212 MG/DL (ref 0–200)
CO2 SERPL-SCNC: 27.9 MMOL/L (ref 22–29)
CREAT SERPL-MCNC: 0.62 MG/DL (ref 0.57–1)
EOSINOPHIL # BLD AUTO: 0.36 10*3/MM3 (ref 0–0.4)
EOSINOPHIL NFR BLD AUTO: 6.8 % (ref 0.3–6.2)
ERYTHROCYTE [DISTWIDTH] IN BLOOD BY AUTOMATED COUNT: 13.2 % (ref 12.3–15.4)
GLOBULIN SER CALC-MCNC: 2.2 GM/DL
GLUCOSE SERPL-MCNC: 91 MG/DL (ref 65–99)
HCT VFR BLD AUTO: 42.7 % (ref 34–46.6)
HDLC SERPL-MCNC: 96 MG/DL (ref 40–60)
HGB BLD-MCNC: 13.4 G/DL (ref 12–15.9)
IMM GRANULOCYTES # BLD AUTO: 0.01 10*3/MM3 (ref 0–0.05)
IMM GRANULOCYTES NFR BLD AUTO: 0.2 % (ref 0–0.5)
LDLC SERPL CALC-MCNC: 104 MG/DL (ref 0–100)
LYMPHOCYTES # BLD AUTO: 1.68 10*3/MM3 (ref 0.7–3.1)
LYMPHOCYTES NFR BLD AUTO: 31.8 % (ref 19.6–45.3)
MCH RBC QN AUTO: 31.5 PG (ref 26.6–33)
MCHC RBC AUTO-ENTMCNC: 31.4 G/DL (ref 31.5–35.7)
MCV RBC AUTO: 100.2 FL (ref 79–97)
MONOCYTES # BLD AUTO: 0.35 10*3/MM3 (ref 0.1–0.9)
MONOCYTES NFR BLD AUTO: 6.6 % (ref 5–12)
NEUTROPHILS # BLD AUTO: 2.86 10*3/MM3 (ref 1.7–7)
NEUTROPHILS NFR BLD AUTO: 54 % (ref 42.7–76)
NRBC BLD AUTO-RTO: 0 /100 WBC (ref 0–0.2)
PLATELET # BLD AUTO: 338 10*3/MM3 (ref 140–450)
POTASSIUM SERPL-SCNC: 4.7 MMOL/L (ref 3.5–5.2)
PROT SERPL-MCNC: 7.1 G/DL (ref 6–8.5)
RBC # BLD AUTO: 4.26 10*6/MM3 (ref 3.77–5.28)
SODIUM SERPL-SCNC: 141 MMOL/L (ref 136–145)
TRIGL SERPL-MCNC: 58 MG/DL (ref 0–150)
TSH SERPL DL<=0.005 MIU/L-ACNC: 2.15 MIU/ML (ref 0.27–4.2)
VIT B12 SERPL-MCNC: 449 PG/ML (ref 211–946)
VLDLC SERPL CALC-MCNC: 11.6 MG/DL
WBC # BLD AUTO: 5.29 10*3/MM3 (ref 3.4–10.8)

## 2019-09-23 ENCOUNTER — OFFICE VISIT (OUTPATIENT)
Dept: INTERNAL MEDICINE | Facility: CLINIC | Age: 59
End: 2019-09-23

## 2019-09-23 VITALS
WEIGHT: 175 LBS | RESPIRATION RATE: 16 BRPM | OXYGEN SATURATION: 98 % | BODY MASS INDEX: 33.04 KG/M2 | SYSTOLIC BLOOD PRESSURE: 126 MMHG | HEIGHT: 61 IN | TEMPERATURE: 98.2 F | HEART RATE: 65 BPM | DIASTOLIC BLOOD PRESSURE: 80 MMHG

## 2019-09-23 DIAGNOSIS — Z00.00 HEALTHCARE MAINTENANCE: Primary | ICD-10-CM

## 2019-09-23 DIAGNOSIS — Z86.19 HISTORY OF HPV INFECTION: ICD-10-CM

## 2019-09-23 DIAGNOSIS — Z01.419 ENCOUNTER FOR ANNUAL ROUTINE GYNECOLOGICAL EXAMINATION: ICD-10-CM

## 2019-09-23 PROBLEM — Z11.59 NEED FOR HEPATITIS C SCREENING TEST: Status: RESOLVED | Noted: 2017-11-20 | Resolved: 2019-09-23

## 2019-09-23 PROBLEM — Z12.83 SKIN CANCER SCREENING: Status: RESOLVED | Noted: 2017-11-20 | Resolved: 2019-09-23

## 2019-09-23 PROCEDURE — 99396 PREV VISIT EST AGE 40-64: CPT | Performed by: NURSE PRACTITIONER

## 2019-09-23 NOTE — PROGRESS NOTES
Annual Exam        Kellen Henriquez is being seen for a Complete physical exam. Her last physical was 11-.     Social: She is  and works in [employer] at [employer] in [place] [place].     Lifestyle: She does not use tobacco. She drinks 3 alcoholic drinks per night, Alex Light Beer. She exercises 0 times a week.    Screening: Colonoscopy was completed 8- with polypectomy, repeat in 5 years. Last labs reviewed from 8-      Reproductive Health: Her Last Pap smear was 11-, history of HPV infection. Last menstrual period was at gae 52 years. DEXA scan complete never. Last Mammogram was 12-.    Dental exam is up to date. Eye exam was completed 2 years ago.     History of Present Illness     The following portions of the patient's history were reviewed and updated as appropriate: allergies, current medications, past family history, past medical history, past social history, past surgical history and problem list.    Review of Systems   Constitutional: Negative.    HENT: Negative.    Eyes: Negative.    Respiratory: Negative.    Cardiovascular: Negative.  Negative for chest pain, palpitations and leg swelling.   Gastrointestinal: Negative.    Endocrine: Negative.    Genitourinary: Negative.    Musculoskeletal: Positive for back pain.   Skin: Negative.    Allergic/Immunologic: Negative.    Neurological: Negative.    Hematological: Negative.    Psychiatric/Behavioral: Negative.        Objective       General Appearance:    Alert, cooperative, no distress, appears stated age   Head:    Normocephalic, without obvious abnormality, atraumatic   Eyes:    PERRL, conjunctiva/corneas clear, EOM's intact, fundi     benign, both eyes   Ears:    Normal TM's and external ear canals, both ears   Nose:   Nares normal, septum midline, mucosa normal, no drainage     or sinus tenderness   Throat:   Lips, mucosa, and tongue normal; teeth and gums normal   Neck:   Supple, symmetrical,  trachea midline, no adenopathy;     thyroid:  no enlargement/tenderness/nodules; no carotid    bruit or JVD   Back:     Symmetric, no curvature, ROM normal, no CVA tenderness   Lungs:     Clear to auscultation bilaterally, respirations unlabored   Chest Wall:    No tenderness or deformity    Heart:    Regular rate and rhythm, S1 and S2 normal, no murmur, rub    or gallop   Breast Exam:    No tenderness, masses, or nipple abnormality   Abdomen:     Soft, non-tender, bowel sounds active all four quadrants,     no masses, no organomegaly   Genitalia:    Normal female without lesion, discharge or tenderness   Rectal:    Normal tone, no masses or tenderness   Extremities:   Extremities normal, atraumatic, no cyanosis or edema   Pulses:   2+ and symmetric all extremities   Skin:   Skin color, texture, turgor normal, no rashes or lesions   Lymph nodes:   Cervical, supraclavicular, and axillary nodes normal   Neurologic:   CNII-XII intact, normal strength, sensation and reflexes     throughout               Assessment/Plan   Kellen was seen today for annual exam.    Diagnoses and all orders for this visit:    Healthcare maintenance    History of HPV infection      Preventive counseling: Recommended alcohol reduction to 7 beers per week/ Increase activity level to reduce cholesterol and increase cardiovascular strength. Discussed immunizations, [facility] complete today. Colonoscopy due in 5 years for colon cancer screening.       She will follow up at next scheduled appointment in 1 year with an Annual Physical

## 2019-09-27 LAB
C TRACH RRNA CVX QL NAA+PROBE: NEGATIVE
CYTOLOGIST CVX/VAG CYTO: NORMAL
CYTOLOGY CVX/VAG DOC CYTO: NORMAL
CYTOLOGY CVX/VAG DOC THIN PREP: NORMAL
DX ICD CODE: NORMAL
HIV 1 & 2 AB SER-IMP: NORMAL
HPV I/H RISK 1 DNA CVX QL PROBE+SIG AMP: NEGATIVE
HSV1 DNA SPEC QL NAA+PROBE: NEGATIVE
HSV2 DNA SPEC QL NAA+PROBE: NEGATIVE
N GONORRHOEA RRNA CVX QL NAA+PROBE: NEGATIVE
OTHER STN SPEC: NORMAL
STAT OF ADQ CVX/VAG CYTO-IMP: NORMAL
T VAGINALIS RRNA SPEC QL NAA+PROBE: NEGATIVE

## 2019-11-15 ENCOUNTER — TRANSCRIBE ORDERS (OUTPATIENT)
Dept: ADMINISTRATIVE | Facility: HOSPITAL | Age: 59
End: 2019-11-15

## 2019-11-15 DIAGNOSIS — Z12.31 SCREENING MAMMOGRAM, ENCOUNTER FOR: Primary | ICD-10-CM

## 2019-12-16 ENCOUNTER — APPOINTMENT (OUTPATIENT)
Dept: MAMMOGRAPHY | Facility: HOSPITAL | Age: 59
End: 2019-12-16

## 2021-06-14 ENCOUNTER — OFFICE VISIT (OUTPATIENT)
Dept: INTERNAL MEDICINE | Facility: CLINIC | Age: 61
End: 2021-06-14

## 2021-06-14 VITALS
DIASTOLIC BLOOD PRESSURE: 68 MMHG | BODY MASS INDEX: 33 KG/M2 | OXYGEN SATURATION: 98 % | HEIGHT: 61 IN | WEIGHT: 174.8 LBS | HEART RATE: 72 BPM | SYSTOLIC BLOOD PRESSURE: 118 MMHG | RESPIRATION RATE: 18 BRPM | TEMPERATURE: 97.4 F

## 2021-06-14 DIAGNOSIS — Z13.89 SCREENING FOR BLOOD OR PROTEIN IN URINE: ICD-10-CM

## 2021-06-14 DIAGNOSIS — E55.9 VITAMIN D DEFICIENCY: ICD-10-CM

## 2021-06-14 DIAGNOSIS — Z12.31 SCREENING MAMMOGRAM, ENCOUNTER FOR: ICD-10-CM

## 2021-06-14 DIAGNOSIS — E78.2 MIXED HYPERLIPIDEMIA: ICD-10-CM

## 2021-06-14 DIAGNOSIS — N81.10 FEMALE CYSTOCELE: Primary | ICD-10-CM

## 2021-06-14 PROBLEM — G56.00 CTS (CARPAL TUNNEL SYNDROME): Status: ACTIVE | Noted: 2021-06-14

## 2021-06-14 PROBLEM — Z01.419 ENCOUNTER FOR ANNUAL ROUTINE GYNECOLOGICAL EXAMINATION: Status: RESOLVED | Noted: 2017-11-20 | Resolved: 2021-06-14

## 2021-06-14 LAB
BILIRUB BLD-MCNC: NEGATIVE MG/DL
CLARITY, POC: CLEAR
COLOR UR: YELLOW
GLUCOSE UR STRIP-MCNC: NEGATIVE MG/DL
KETONES UR QL: NEGATIVE
LEUKOCYTE EST, POC: NEGATIVE
NITRITE UR-MCNC: NEGATIVE MG/ML
PH UR: 5 [PH] (ref 5–8)
PROT UR STRIP-MCNC: NEGATIVE MG/DL
RBC # UR STRIP: ABNORMAL /UL
SP GR UR: 1.02 (ref 1–1.03)
UROBILINOGEN UR QL: NORMAL

## 2021-06-14 PROCEDURE — 81003 URINALYSIS AUTO W/O SCOPE: CPT | Performed by: NURSE PRACTITIONER

## 2021-06-14 PROCEDURE — 99213 OFFICE O/P EST LOW 20 MIN: CPT | Performed by: NURSE PRACTITIONER

## 2021-06-14 NOTE — PROGRESS NOTES
"Chief Complaint   Patient presents with   • Hyperlipidemia   • Urge incontinence of urine   • Hypertonicity of bladder       Subjective     Kellen Henriquez is a 60 y.o. female being seen for a follow up appointment today regarding \"Bladder issues\". She has had issues with \"my bladder falling ou\" for the past few years.  She reports that se has had this for several years, but it is getting worse. She can feel her bladder when she is in the shower, aching pain. She has associated stress incontinence or leakage with coughing, sneezing and laughing. She has had a Tubal ligation in the past.      She also has a history of hyperlipidemia and Vitamin D def, and would like have labs completed today. She has not been seen in > 1 yr.      History of Present Illness     Allergies   Allergen Reactions   • Nsaids Other (See Comments)     Elevated BP       No current outpatient medications on file.    The following portions of the patient's history were reviewed and updated as appropriate: allergies, current medications, past family history, past medical history, past social history, past surgical history and problem list.    Review of Systems   Constitutional: Negative.    HENT: Negative.    Eyes: Negative.    Respiratory: Negative.    Cardiovascular: Negative.    Gastrointestinal: Negative.    Endocrine: Negative.    Genitourinary: Positive for frequency, pelvic pain and urgency. Negative for menstrual problem.   Musculoskeletal: Negative.    Allergic/Immunologic: Negative.  Negative for environmental allergies and food allergies.   Neurological: Negative.    Hematological: Negative.    Psychiatric/Behavioral: Negative.    All other systems reviewed and are negative.      Assessment     Physical Exam  Vitals reviewed.   Constitutional:       Appearance: Normal appearance. She is obese. She is not ill-appearing.   HENT:      Head: Normocephalic.      Right Ear: Tympanic membrane normal.      Nose: Nose normal.   Eyes:      " Pupils: Pupils are equal, round, and reactive to light.   Cardiovascular:      Rate and Rhythm: Normal rate and regular rhythm.      Pulses: Normal pulses.      Heart sounds: Normal heart sounds. No murmur heard.     Pulmonary:      Effort: Pulmonary effort is normal. No respiratory distress.      Breath sounds: Normal breath sounds. No stridor. No wheezing or rhonchi.   Abdominal:      General: Abdomen is flat. There is no distension.      Palpations: There is no mass.   Musculoskeletal:         General: No swelling.      Cervical back: Neck supple. No tenderness.   Skin:     General: Skin is warm and dry.      Capillary Refill: Capillary refill takes less than 2 seconds.   Neurological:      General: No focal deficit present.      Mental Status: She is alert and oriented to person, place, and time.   Psychiatric:         Mood and Affect: Mood normal.         Behavior: Behavior normal.         Thought Content: Thought content normal.         Plan     Her fasting [employer] screenings were reviewed from June 2020.     Diagnoses and all orders for this visit:    1. Female cystocele (Primary)  -     Ambulatory Referral to Gynecologic Urology  -     POC Urinalysis Dipstick, Automated    2. Vitamin D deficiency  -     CBC (No Diff)  -     Vitamin D 25 Hydroxy    3. Mixed hyperlipidemia  -     CBC (No Diff)  -     Lipid Panel With / Chol / HDL Ratio  -     Comprehensive Metabolic Panel  -     Vitamin D 25 Hydroxy  -     TSH    4. Screening mammogram, encounter for  -     Mammo Screening Bilateral With CAD; Future    5. Screening for blood or protein in urine        She will need to set up a CPE

## 2021-06-15 ENCOUNTER — TELEPHONE (OUTPATIENT)
Dept: INTERNAL MEDICINE | Facility: CLINIC | Age: 61
End: 2021-06-15

## 2021-06-15 LAB
25(OH)D3+25(OH)D2 SERPL-MCNC: 30 NG/ML (ref 30–100)
ALBUMIN SERPL-MCNC: 4.8 G/DL (ref 3.5–5.2)
ALBUMIN/GLOB SERPL: 2.4 G/DL
ALP SERPL-CCNC: 75 U/L (ref 39–117)
ALT SERPL-CCNC: 12 U/L (ref 1–33)
AST SERPL-CCNC: 17 U/L (ref 1–32)
BILIRUB SERPL-MCNC: 0.4 MG/DL (ref 0–1.2)
BUN SERPL-MCNC: 14 MG/DL (ref 6–20)
BUN/CREAT SERPL: 25.9 (ref 7–25)
CALCIUM SERPL-MCNC: 9.4 MG/DL (ref 8.6–10.5)
CHLORIDE SERPL-SCNC: 102 MMOL/L (ref 98–107)
CHOLEST SERPL-MCNC: 229 MG/DL (ref 0–200)
CHOLEST/HDLC SERPL: 2.36 {RATIO}
CO2 SERPL-SCNC: 25.8 MMOL/L (ref 22–29)
CREAT SERPL-MCNC: 0.54 MG/DL (ref 0.57–1)
ERYTHROCYTE [DISTWIDTH] IN BLOOD BY AUTOMATED COUNT: 12.3 % (ref 12.3–15.4)
GLOBULIN SER CALC-MCNC: 2 GM/DL
GLUCOSE SERPL-MCNC: 82 MG/DL (ref 65–99)
HCT VFR BLD AUTO: 38.5 % (ref 34–46.6)
HDLC SERPL-MCNC: 97 MG/DL (ref 40–60)
HGB BLD-MCNC: 13.4 G/DL (ref 12–15.9)
LDLC SERPL CALC-MCNC: 123 MG/DL (ref 0–100)
MCH RBC QN AUTO: 33.3 PG (ref 26.6–33)
MCHC RBC AUTO-ENTMCNC: 34.8 G/DL (ref 31.5–35.7)
MCV RBC AUTO: 95.5 FL (ref 79–97)
PLATELET # BLD AUTO: 347 10*3/MM3 (ref 140–450)
POTASSIUM SERPL-SCNC: 4.5 MMOL/L (ref 3.5–5.2)
PROT SERPL-MCNC: 6.8 G/DL (ref 6–8.5)
RBC # BLD AUTO: 4.03 10*6/MM3 (ref 3.77–5.28)
SODIUM SERPL-SCNC: 139 MMOL/L (ref 136–145)
TRIGL SERPL-MCNC: 50 MG/DL (ref 0–150)
TSH SERPL DL<=0.005 MIU/L-ACNC: 2.27 UIU/ML (ref 0.27–4.2)
VLDLC SERPL CALC-MCNC: 9 MG/DL (ref 5–40)
WBC # BLD AUTO: 6.44 10*3/MM3 (ref 3.4–10.8)

## 2021-06-15 NOTE — TELEPHONE ENCOUNTER
Hub please inform patients, the results of   Her labs do not show any anemia, no diabetes, no vitamin D Def, and no thyroid dysfunction. Set up her annual CPE with fasting labs if not done already.

## 2021-06-22 ENCOUNTER — TRANSCRIBE ORDERS (OUTPATIENT)
Dept: ADMINISTRATIVE | Facility: HOSPITAL | Age: 61
End: 2021-06-22

## 2021-06-22 DIAGNOSIS — Z12.31 ENCOUNTER FOR SCREENING MAMMOGRAM FOR MALIGNANT NEOPLASM OF BREAST: Primary | ICD-10-CM

## 2021-07-30 ENCOUNTER — HOSPITAL ENCOUNTER (OUTPATIENT)
Dept: MAMMOGRAPHY | Facility: HOSPITAL | Age: 61
Discharge: HOME | End: 2021-07-30

## 2021-07-30 DIAGNOSIS — Z12.31 ENCOUNTER FOR SCREENING MAMMOGRAM FOR MALIGNANT NEOPLASM OF BREAST: ICD-10-CM

## 2021-10-11 ENCOUNTER — HOSPITAL ENCOUNTER (OUTPATIENT)
Dept: MAMMOGRAPHY | Facility: HOSPITAL | Age: 61
Discharge: HOME | End: 2021-10-11
Admitting: NURSE PRACTITIONER

## 2021-10-11 PROCEDURE — 77063 BREAST TOMOSYNTHESIS BI: CPT

## 2021-10-11 PROCEDURE — 77067 SCR MAMMO BI INCL CAD: CPT

## 2022-04-18 ENCOUNTER — OFFICE VISIT (OUTPATIENT)
Dept: INTERNAL MEDICINE | Facility: CLINIC | Age: 62
End: 2022-04-18

## 2022-04-18 VITALS
HEART RATE: 68 BPM | HEIGHT: 61 IN | OXYGEN SATURATION: 97 % | BODY MASS INDEX: 33.83 KG/M2 | WEIGHT: 179.2 LBS | DIASTOLIC BLOOD PRESSURE: 80 MMHG | TEMPERATURE: 96.2 F | SYSTOLIC BLOOD PRESSURE: 118 MMHG

## 2022-04-18 DIAGNOSIS — N81.10 FEMALE CYSTOCELE: Primary | ICD-10-CM

## 2022-04-18 DIAGNOSIS — N81.4 UTEROVAGINAL PROLAPSE: ICD-10-CM

## 2022-04-18 DIAGNOSIS — R35.0 URINARY FREQUENCY: ICD-10-CM

## 2022-04-18 LAB
BILIRUB BLD-MCNC: NEGATIVE MG/DL
CLARITY, POC: CLEAR
COLOR UR: YELLOW
EXPIRATION DATE: ABNORMAL
GLUCOSE UR STRIP-MCNC: NEGATIVE MG/DL
KETONES UR QL: NEGATIVE
LEUKOCYTE EST, POC: ABNORMAL
Lab: ABNORMAL
NITRITE UR-MCNC: NEGATIVE MG/ML
PH UR: 6 [PH] (ref 5–8)
PROT UR STRIP-MCNC: NEGATIVE MG/DL
RBC # UR STRIP: NEGATIVE /UL
SP GR UR: 1.02 (ref 1–1.03)
UROBILINOGEN UR QL: NORMAL

## 2022-04-18 PROCEDURE — 99212 OFFICE O/P EST SF 10 MIN: CPT | Performed by: NURSE PRACTITIONER

## 2022-04-18 NOTE — PROGRESS NOTES
"Chief Complaint   Patient presents with   • Difficulty Urinating     Frequency urinate, bladder dropped       Subjective     Kellennancy Henriquez is a 61 y.o. female being seen for an acute appt for \"bladder issues.\" She has a history of cystocele, and feels like her \"bladder is falling out.\" She was referred to Dr. Gaytan for repair in June 221, but never made the appt.  She is having mixed stress and urge urinary incontinence and urinary frequency.. Denies blood in urine, abd pain, or painful urination. She has history of tubal ligation. PAPIG, CTNGTVHSV,HPV,RFX16 / 18 (09/23/2019 14:25)          History of Present Illness     Allergies   Allergen Reactions   • Nsaids Other (See Comments)     Elevated BP       No current outpatient medications on file.    The following portions of the patient's history were reviewed and updated as appropriate: allergies, current medications, past family history, past medical history, past social history, past surgical history and problem list.    Review of Systems   Constitutional: Negative.    HENT: Negative.    Respiratory: Negative.  Negative for cough, shortness of breath and stridor.    Cardiovascular: Negative.  Negative for chest pain, palpitations and leg swelling.   Genitourinary: Positive for frequency and urgency.   Psychiatric/Behavioral: Negative.    All other systems reviewed and are negative.      Assessment     Physical Exam  Vitals reviewed.   Constitutional:       General: She is not in acute distress.     Appearance: Normal appearance. She is obese. She is not ill-appearing.   Cardiovascular:      Pulses: Normal pulses.      Heart sounds: Normal heart sounds. No murmur heard.  Pulmonary:      Effort: Pulmonary effort is normal.      Breath sounds: Normal breath sounds.   Neurological:      General: No focal deficit present.      Mental Status: She is alert and oriented to person, place, and time.   Psychiatric:         Mood and Affect: Mood normal.         Behavior: " Behavior normal.         Thought Content: Thought content normal.         Plan         Diagnoses and all orders for this visit:    1. Female cystocele (Primary)  -     POCT urinalysis dipstick, automated    2. Urinary frequency  -     POCT urinalysis dipstick, automated    3. Uterovaginal prolapse  -     POCT urinalysis dipstick, automated    Urine today showed trace leuks, no culture indicated.    Call placed to Uro/gyn to call back for appt to discuss pelvic prolapse    Follow up with a IZZY

## 2022-08-29 ENCOUNTER — TELEPHONE (OUTPATIENT)
Dept: INTERNAL MEDICINE | Facility: CLINIC | Age: 62
End: 2022-08-29

## 2022-11-21 ENCOUNTER — TRANSCRIBE ORDERS (OUTPATIENT)
Dept: ADMINISTRATIVE | Facility: HOSPITAL | Age: 62
End: 2022-11-21

## 2022-11-21 ENCOUNTER — TELEPHONE (OUTPATIENT)
Dept: INTERNAL MEDICINE | Facility: CLINIC | Age: 62
End: 2022-11-21

## 2022-11-21 DIAGNOSIS — Z12.31 VISIT FOR SCREENING MAMMOGRAM: Primary | ICD-10-CM

## 2022-11-21 NOTE — TELEPHONE ENCOUNTER
Caller: Kellen Henriquez    Relationship to patient: Self    Best call back number: [phone] (Mobile)    Chief complaint: SKIN TAG REMOVAL ON THE FRONT SIDE OF NECK    Type of visit: IN OFFICE PROCEDURE    Requested date: 12/19/2022 AT 11:15AM    If rescheduling, when is the original appointment: N/A     Additional notes: PATIENT WANTS TO HAVE THIS PROCEDURE DONE ON THE SAME DAY SHE IS IN THE OFFICE, PLEASE CALL PATIENT BACK AND LET HER KNOW IF THIS IS POSSIBLE ASAP

## 2022-11-23 NOTE — TELEPHONE ENCOUNTER
Pt is requesting to have procedure to remove skin tag when she is being seen on 12/19 @11:15 am. Are you ok with this?

## 2022-11-23 NOTE — TELEPHONE ENCOUNTER
As long as she is scheduled as a procedure and we have the appropriate blades and lidocaine for procedure, we can do it the day of visit.

## 2022-12-14 ENCOUNTER — TELEPHONE (OUTPATIENT)
Dept: INTERNAL MEDICINE | Facility: CLINIC | Age: 62
End: 2022-12-14

## 2022-12-16 DIAGNOSIS — E78.2 MIXED HYPERLIPIDEMIA: Primary | ICD-10-CM

## 2022-12-16 DIAGNOSIS — E55.9 VITAMIN D DEFICIENCY: ICD-10-CM

## 2022-12-19 ENCOUNTER — OFFICE VISIT (OUTPATIENT)
Dept: INTERNAL MEDICINE | Facility: CLINIC | Age: 62
End: 2022-12-19

## 2022-12-19 ENCOUNTER — HOSPITAL ENCOUNTER (OUTPATIENT)
Dept: MAMMOGRAPHY | Facility: HOSPITAL | Age: 62
End: 2022-12-19

## 2022-12-19 VITALS
HEIGHT: 61 IN | WEIGHT: 182.8 LBS | DIASTOLIC BLOOD PRESSURE: 90 MMHG | OXYGEN SATURATION: 97 % | HEART RATE: 86 BPM | TEMPERATURE: 98 F | SYSTOLIC BLOOD PRESSURE: 130 MMHG | BODY MASS INDEX: 34.51 KG/M2

## 2022-12-19 DIAGNOSIS — Z00.00 HEALTHCARE MAINTENANCE: Primary | ICD-10-CM

## 2022-12-19 DIAGNOSIS — N81.4 CYSTOCELE WITH PROLAPSE: ICD-10-CM

## 2022-12-19 DIAGNOSIS — Z23 NEED FOR VACCINATION: ICD-10-CM

## 2022-12-19 DIAGNOSIS — N81.89 PELVIC FLOOR WEAKNESS IN FEMALE: ICD-10-CM

## 2022-12-19 DIAGNOSIS — Z12.4 PAP SMEAR FOR CERVICAL CANCER SCREENING: ICD-10-CM

## 2022-12-19 DIAGNOSIS — E78.2 MIXED HYPERLIPIDEMIA: ICD-10-CM

## 2022-12-19 PROCEDURE — 90471 IMMUNIZATION ADMIN: CPT | Performed by: NURSE PRACTITIONER

## 2022-12-19 PROCEDURE — 90686 IIV4 VACC NO PRSV 0.5 ML IM: CPT | Performed by: NURSE PRACTITIONER

## 2022-12-19 PROCEDURE — 99396 PREV VISIT EST AGE 40-64: CPT | Performed by: NURSE PRACTITIONER

## 2022-12-19 NOTE — PROGRESS NOTES
Annual Exam (Physical)        Kellen Henriquez is being seen for a Complete physical exam. Her last physical was 9-.     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     Lifestyle: She does not use tobacco. She drinks 3 alcoholic drinks per week, Alex Light Beer. She exercises 0 times a week.    Screening: Colonoscopy was completed 8- with polypectomy, repeat in 5 years. Last labs reviewed from June 2021.      Reproductive Health: Her Last Pap smear was 9-. History of HPV. She was referred to uro-gynecology, but they did not take her insurance, so she canceled an appt. Last menstrual period was at age 52 years.  DEXA scan complete never. Last Mammogram was 10-, rescheduled appt this month.    History of Present Illness     The following portions of the patient's history were reviewed and updated as appropriate: allergies, current medications, past family history, past medical history, past social history, past surgical history and problem list.    Review of Systems   HENT: Negative.    Respiratory: Negative.    Cardiovascular: Negative.  Negative for chest pain, palpitations and leg swelling.   Gastrointestinal: Negative.    Genitourinary: Positive for urgency.   Musculoskeletal: Negative.        Objective       General Appearance:    Alert, cooperative, no distress, appears stated age   Head:    Normocephalic, without obvious abnormality, atraumatic   Eyes:    PERRL, conjunctiva/corneas clear, EOM's intact, fundi     benign, both eyes   Ears:    Normal TM's and external ear canals, both ears   Nose:   Nares normal, septum midline, mucosa normal, no drainage     or sinus tenderness   Throat:   Lips, mucosa, and tongue normal; teeth and gums normal   Neck:   Supple, symmetrical, trachea midline, no adenopathy;     thyroid:  no enlargement/tenderness/nodules; no carotid    bruit or JVD   Back:     Symmetric, no curvature, ROM normal, no  CVA tenderness   Lungs:     Clear to auscultation bilaterally, respirations unlabored   Chest Wall:    No tenderness or deformity    Heart:    Regular rate and rhythm, S1 and S2 normal, no murmur, rub    or gallop   Breast Exam:    No tenderness, masses, or nipple abnormality   Abdomen:     Soft, non-tender, bowel sounds active all four quadrants,     no masses, no organomegaly   Genitalia:    Grade-34 cystocele   Rectal:    Normal tone, no masses or tenderness   Extremities:   Extremities normal, atraumatic, no cyanosis or edema   Pulses:   2+ and symmetric all extremities   Skin:   Skin color, texture, turgor normal, no rashes or lesions   Lymph nodes:   Cervical, supraclavicular, and axillary nodes normal   Neurologic:   CNII-XII intact, normal strength, sensation and reflexes     throughout               Assessment & Plan   Diagnoses and all orders for this visit:    1. Healthcare maintenance (Primary)    2. Mixed hyperlipidemia    3. Cystocele with prolapse  -     Ambulatory Referral to Gynecology    4. Pap smear for cervical cancer screening  -     IgP, Aptima HPV    5. Pelvic floor weakness in female  -     Ambulatory Referral to Gynecology    6. Need for vaccination  -     FluLaval/Fluzone >6 mos ([id number])          Preventive counseling: Covid vaccine discussed, and she will get in the next 4 weeks. Reviewed her [employer] screening, scanned into chart. Exercise routine needed for cholesterol control and reduction of CV risk.      She will follow up at next scheduled appointment in  1year

## 2022-12-30 LAB
CYTOLOGIST CVX/VAG CYTO: NORMAL
CYTOLOGY CVX/VAG DOC CYTO: NORMAL
CYTOLOGY CVX/VAG DOC THIN PREP: NORMAL
DX ICD CODE: NORMAL
HIV 1 & 2 AB SER-IMP: NORMAL
HPV I/H RISK 4 DNA CVX QL PROBE+SIG AMP: NEGATIVE
Lab: NORMAL
OTHER STN SPEC: NORMAL
RECOM F/U CVX/VAG CYTO: NORMAL
STAT OF ADQ CVX/VAG CYTO-IMP: NORMAL

## 2023-01-27 ENCOUNTER — HOSPITAL ENCOUNTER (OUTPATIENT)
Dept: MAMMOGRAPHY | Facility: HOSPITAL | Age: 63
Discharge: HOME | End: 2023-01-27
Admitting: NURSE PRACTITIONER
Payer: COMMERCIAL

## 2023-01-27 ENCOUNTER — OFFICE VISIT (OUTPATIENT)
Dept: OBSTETRICS AND GYNECOLOGY | Facility: CLINIC | Age: 63
End: 2023-01-27
Payer: COMMERCIAL

## 2023-01-27 VITALS
SYSTOLIC BLOOD PRESSURE: 128 MMHG | DIASTOLIC BLOOD PRESSURE: 82 MMHG | WEIGHT: 186 LBS | BODY MASS INDEX: 35.12 KG/M2 | HEIGHT: 61 IN

## 2023-01-27 DIAGNOSIS — N81.4 CYSTOCELE WITH PROLAPSE: ICD-10-CM

## 2023-01-27 DIAGNOSIS — Z11.51 ENCOUNTER FOR SCREENING FOR HUMAN PAPILLOMAVIRUS (HPV): ICD-10-CM

## 2023-01-27 DIAGNOSIS — Z12.31 VISIT FOR SCREENING MAMMOGRAM: ICD-10-CM

## 2023-01-27 DIAGNOSIS — Z01.419 PAP SMEAR, LOW-RISK: Primary | ICD-10-CM

## 2023-01-27 DIAGNOSIS — N81.3 PROCIDENTIA OF UTERUS: ICD-10-CM

## 2023-01-27 PROCEDURE — 77063 BREAST TOMOSYNTHESIS BI: CPT

## 2023-01-27 PROCEDURE — 77067 SCR MAMMO BI INCL CAD: CPT

## 2023-01-27 PROCEDURE — 99203 OFFICE O/P NEW LOW 30 MIN: CPT | Performed by: OBSTETRICS & GYNECOLOGY

## 2023-01-27 NOTE — PROGRESS NOTES
Called and spoke with patient and informed her of her results, she stated her understanding of the findings

## 2023-02-01 LAB
CYTOLOGIST CVX/VAG CYTO: NORMAL
CYTOLOGY CVX/VAG DOC CYTO: NORMAL
CYTOLOGY CVX/VAG DOC THIN PREP: NORMAL
DX ICD CODE: NORMAL
HIV 1 & 2 AB SER-IMP: NORMAL
HPV I/H RISK 4 DNA CVX QL PROBE+SIG AMP: NEGATIVE
OTHER STN SPEC: NORMAL
STAT OF ADQ CVX/VAG CYTO-IMP: NORMAL

## 2023-02-28 ENCOUNTER — TRANSCRIBE ORDERS (OUTPATIENT)
Dept: ADMINISTRATIVE | Facility: HOSPITAL | Age: 63
End: 2023-02-28
Payer: COMMERCIAL

## 2023-02-28 DIAGNOSIS — N81.3 UTEROVAGINAL PROLAPSE, COMPLETE: Primary | ICD-10-CM

## 2023-03-13 ENCOUNTER — HOSPITAL ENCOUNTER (OUTPATIENT)
Dept: ULTRASOUND IMAGING | Facility: HOSPITAL | Age: 63
Discharge: HOME | End: 2023-03-13
Admitting: OBSTETRICS & GYNECOLOGY
Payer: COMMERCIAL

## 2023-03-13 DIAGNOSIS — N81.3 UTEROVAGINAL PROLAPSE, COMPLETE: ICD-10-CM

## 2023-03-13 PROCEDURE — 76856 US EXAM PELVIC COMPLETE: CPT

## 2023-03-13 PROCEDURE — 76830 TRANSVAGINAL US NON-OB: CPT

## 2024-03-12 ENCOUNTER — TRANSCRIBE ORDERS (OUTPATIENT)
Dept: ADMINISTRATIVE | Facility: HOSPITAL | Age: 64
End: 2024-03-12
Payer: COMMERCIAL

## 2024-03-12 DIAGNOSIS — Z12.31 VISIT FOR SCREENING MAMMOGRAM: Primary | ICD-10-CM

## 2024-04-03 ENCOUNTER — HOSPITAL ENCOUNTER (OUTPATIENT)
Dept: MAMMOGRAPHY | Facility: HOSPITAL | Age: 64
Discharge: HOME | End: 2024-04-03
Admitting: NURSE PRACTITIONER
Payer: COMMERCIAL

## 2024-04-03 DIAGNOSIS — Z12.31 VISIT FOR SCREENING MAMMOGRAM: ICD-10-CM

## 2024-04-03 PROCEDURE — 77063 BREAST TOMOSYNTHESIS BI: CPT | Performed by: RADIOLOGY

## 2024-04-03 PROCEDURE — 77067 SCR MAMMO BI INCL CAD: CPT | Performed by: RADIOLOGY

## 2024-04-03 PROCEDURE — 77067 SCR MAMMO BI INCL CAD: CPT

## 2024-04-03 PROCEDURE — 77063 BREAST TOMOSYNTHESIS BI: CPT

## 2024-07-01 ENCOUNTER — OFFICE VISIT (OUTPATIENT)
Dept: INTERNAL MEDICINE | Facility: CLINIC | Age: 64
End: 2024-07-01
Payer: COMMERCIAL

## 2024-07-01 VITALS
HEART RATE: 76 BPM | BODY MASS INDEX: 35.27 KG/M2 | OXYGEN SATURATION: 95 % | TEMPERATURE: 99.1 F | WEIGHT: 186.8 LBS | SYSTOLIC BLOOD PRESSURE: 118 MMHG | HEIGHT: 61 IN | DIASTOLIC BLOOD PRESSURE: 78 MMHG

## 2024-07-01 DIAGNOSIS — R13.19 ESOPHAGEAL DYSPHAGIA: ICD-10-CM

## 2024-07-01 DIAGNOSIS — Z12.11 ENCOUNTER FOR SCREENING COLONOSCOPY: ICD-10-CM

## 2024-07-01 DIAGNOSIS — K21.9 GASTROESOPHAGEAL REFLUX DISEASE, UNSPECIFIED WHETHER ESOPHAGITIS PRESENT: Primary | ICD-10-CM

## 2024-07-01 PROBLEM — Z86.0100 PERSONAL HISTORY OF COLONIC POLYPS: Status: ACTIVE | Noted: 2024-07-01

## 2024-07-01 PROBLEM — Z86.010 PERSONAL HISTORY OF COLONIC POLYPS: Status: ACTIVE | Noted: 2024-07-01

## 2024-07-01 PROCEDURE — 1126F AMNT PAIN NOTED NONE PRSNT: CPT | Performed by: NURSE PRACTITIONER

## 2024-07-01 PROCEDURE — 99213 OFFICE O/P EST LOW 20 MIN: CPT | Performed by: NURSE PRACTITIONER

## 2024-07-01 RX ORDER — PANTOPRAZOLE SODIUM 40 MG/1
40 TABLET, DELAYED RELEASE ORAL DAILY
Qty: 90 TABLET | Refills: 0 | Status: SHIPPED | OUTPATIENT
Start: 2024-07-01

## 2024-07-01 NOTE — PROGRESS NOTES
"Chief Complaint   Patient presents with    Heartburn     Having for about a year.    Weight Loss     Would like to discuss why she is gaining weight       Subjective     Kellen Henriquez is a 63 y.o. female being seen for an acute visit to discuss heart burn. This began about 1 year ago. Described as burning pain in throat and chest. Worse at night with occasional vomiting. She has tried OTC omeprazole 20mg and Tums. She denies abd pain, bowel changes. She is having meat \"get stuck\" in her chest area. She is due to repeat C-scope.      Answers submitted by the patient for this visit:  Other (Submitted on 6/24/2024)  Please describe your symptoms.: Heartburn. Digestive problems.  Have you had these symptoms before?: Yes  How long have you been having these symptoms?: Greater than 2 weeks  Primary Reason for Visit (Submitted on 6/24/2024)  What is the primary reason for your visit?: Other    History of Present Illness     Allergies   Allergen Reactions    Nsaids Other (See Comments)     Elevated BP       No current outpatient medications on file.    The following portions of the patient's history were reviewed and updated as appropriate: allergies, current medications, past family history, past medical history, past social history, past surgical history, and problem list.    Review of Systems   Constitutional: Negative.    HENT: Negative.     Respiratory: Negative.     Cardiovascular: Negative.  Negative for chest pain, palpitations and leg swelling.   Gastrointestinal:  Negative for abdominal distention, abdominal pain, blood in stool, constipation and diarrhea.   Psychiatric/Behavioral: Negative.     All other systems reviewed and are negative.      Assessment     Physical Exam  HENT:      Head: Normocephalic.   Cardiovascular:      Rate and Rhythm: Normal rate and regular rhythm.      Pulses: Normal pulses.      Heart sounds: Normal heart sounds. No murmur heard.  Pulmonary:      Effort: Pulmonary effort is normal. " No respiratory distress.      Breath sounds: Normal breath sounds. No stridor.   Abdominal:      General: There is no distension.      Palpations: Abdomen is soft. There is no mass.      Tenderness: There is abdominal tenderness (epigastric).      Hernia: No hernia is present.   Skin:     General: Skin is warm and dry.   Neurological:      Mental Status: She is alert and oriented to person, place, and time.   Psychiatric:         Mood and Affect: Mood normal.         Behavior: Behavior normal.         Thought Content: Thought content normal.         Plan       Diagnoses and all orders for this visit:    1. Gastroesophageal reflux disease, unspecified whether esophagitis present (Primary)  -     Ambulatory Referral For Screening Colonoscopy  -     pantoprazole (Protonix) 40 MG EC tablet; Take 1 tablet by mouth Daily.  Dispense: 90 tablet; Refill: 0    2. Encounter for screening colonoscopy  -     Ambulatory Referral For Screening Colonoscopy    3. Esophageal dysphagia  -     Ambulatory Referral For Screening Colonoscopy        Do not eat 4 hours prior to laying down for bed. Avoid spicy or greasy foods, tomato based products, GERD diet info given to her.     Follow up with a CPE in 4-12 weeks

## 2024-07-02 ENCOUNTER — OFFICE VISIT (OUTPATIENT)
Dept: GASTROENTEROLOGY | Facility: CLINIC | Age: 64
End: 2024-07-02
Payer: COMMERCIAL

## 2024-07-02 VITALS
SYSTOLIC BLOOD PRESSURE: 120 MMHG | WEIGHT: 188.2 LBS | BODY MASS INDEX: 35.53 KG/M2 | HEIGHT: 61 IN | DIASTOLIC BLOOD PRESSURE: 78 MMHG

## 2024-07-02 DIAGNOSIS — Z86.010 PERSONAL HISTORY OF COLONIC POLYPS: ICD-10-CM

## 2024-07-02 DIAGNOSIS — K21.9 GASTROESOPHAGEAL REFLUX DISEASE, UNSPECIFIED WHETHER ESOPHAGITIS PRESENT: ICD-10-CM

## 2024-07-02 DIAGNOSIS — R13.19 ESOPHAGEAL DYSPHAGIA: Primary | ICD-10-CM

## 2024-07-02 NOTE — PROGRESS NOTES
Kellen Henriquez is a 63 y.o. female with a past medical history of GERD + noted below who presents for evaluation of   Chief Complaint   Patient presents with    Difficulty Swallowing    Dyspepsia        Subjective     # GERD   # Dysphagia   - Reports frequent heartburn ongoing for a year. Tends to be worse at night. Unrelieved with OTC Omeprazole.   - Also endorses dysphagia to solid foods such as meat.   - Started on Protonix 40 mg QD yesterday by her PCP which she hasn't had an opportunity to  yet but plans to today.      # Personal history of colon polyps   - Last c/s in 8/2018 had 2 sub-cm tubular adenomas removed. Previously instructed to repeat c/s in 8/2023.               Past Medical History:   Diagnosis Date    H/O mammogram 2003    NORMAL    History of mammogram 11/20/2017    [facility]    OAB (overactive bladder)     Plantar fasciitis          Current Outpatient Medications:     pantoprazole (Protonix) 40 MG EC tablet, Take 1 tablet by mouth Daily. (Patient not taking: Reported on 7/2/2024), Disp: 90 tablet, Rfl: 0    Allergies   Allergen Reactions    Nsaids Other (See Comments)     Elevated BP       Social History     Socioeconomic History    Marital status:    Tobacco Use    Smoking status: Never    Smokeless tobacco: Never   Vaping Use    Vaping status: Never Used   Substance and Sexual Activity    Alcohol use: Yes     Comment: 3 BOTTLES OF BEER PER DAY     Drug use: No    Sexual activity: Not Currently       Family History   Problem Relation Age of Onset    Breast cancer Mother     Diabetes Mother     Hyperlipidemia Mother     Hypertension Mother     Clotting disorder Father     Diabetes Father     Hyperlipidemia Father     Hypertension Father     Hypertension Brother     Liver cancer Brother        Objective     Vitals:    07/02/24 1122   BP: 120/78         07/02/24  1122   Weight: 85.4 kg (188 lb 3.2 oz)     Body mass index is 35.58 kg/m².    Physical Exam  Vitals reviewed.    Constitutional:       Appearance: Normal appearance.   HENT:      Head: Normocephalic and atraumatic.   Eyes:      Extraocular Movements: Extraocular movements intact.      Conjunctiva/sclera: Conjunctivae normal.   Abdominal:      General: Abdomen is flat. Bowel sounds are normal. There is no distension.      Palpations: Abdomen is soft.      Tenderness: There is no abdominal tenderness.   Neurological:      Mental Status: She is alert.         WBC   Date Value Ref Range Status   05/25/2023 9.63 4.5 - 11.0 10*3/uL Final     RBC   Date Value Ref Range Status   05/25/2023 3.50 (L) 4.0 - 5.2 10*6/uL Final     Hemoglobin   Date Value Ref Range Status   05/25/2023 11.3 (L) 12.0 - 16.0 g/dL Final     Hematocrit   Date Value Ref Range Status   05/25/2023 35.1 (L) 36.0 - 46.0 % Final     MCV   Date Value Ref Range Status   05/25/2023 100.3 (H) 80.0 - 100.0 fL Final     MCH   Date Value Ref Range Status   05/25/2023 32.3 26.0 - 34.0 pg Final     MCHC   Date Value Ref Range Status   05/25/2023 32.2 31.0 - 37.0 g/dL Final     RDW   Date Value Ref Range Status   05/25/2023 13.1 12.0 - 16.8 % Final     MPV   Date Value Ref Range Status   05/25/2023 9.8 8.4 - 12.4 fL Final     Platelets   Date Value Ref Range Status   05/25/2023 319 140 - 440 10*3/uL Final     Neutrophil Rel %   Date Value Ref Range Status   05/25/2023 85.5 (H) 45 - 80 % Final     Lymphocyte Rel %   Date Value Ref Range Status   05/25/2023 7.9 (L) 15 - 50 % Final     Monocyte Rel %   Date Value Ref Range Status   05/25/2023 6.0 0 - 15 % Final     Eosinophil %   Date Value Ref Range Status   05/25/2023 0.0 0 - 7 % Final     Basophil Rel %   Date Value Ref Range Status   05/25/2023 0.2 0 - 2 % Final     Immature Grans %   Date Value Ref Range Status   05/25/2023 0.4 0.0 - 1.0 % Final     Neutrophils Absolute   Date Value Ref Range Status   05/25/2023 8.23 2.0 - 8.8 10*3/uL Final     Lymphocytes Absolute   Date Value Ref Range Status   05/25/2023 0.76 0.7 - 5.5  10*3/uL Final     Monocytes Absolute   Date Value Ref Range Status   05/25/2023 0.58 0.0 - 1.7 10*3/uL Final     Eosinophils Absolute   Date Value Ref Range Status   05/25/2023 0.00 0.0 - 0.8 10*3/uL Final     Basophils Absolute   Date Value Ref Range Status   05/25/2023 0.02 0.0 - 0.2 10*3/uL Final     Immature Grans, Absolute   Date Value Ref Range Status   05/25/2023 0.04 0.00 - 0.10 10*3/uL Final     nRBC   Date Value Ref Range Status   05/25/2023 0 0 /100(WBC) Final       Glucose   Date Value Ref Range Status   12/19/2022 89 65 - 99 mg/dL Final     Sodium   Date Value Ref Range Status   12/19/2022 138 136 - 145 mmol/L Final     Potassium   Date Value Ref Range Status   12/19/2022 4.6 3.5 - 5.2 mmol/L Final     Total CO2   Date Value Ref Range Status   12/19/2022 28.3 22.0 - 29.0 mmol/L Final     Chloride   Date Value Ref Range Status   12/19/2022 103 98 - 107 mmol/L Final     Creatinine   Date Value Ref Range Status   12/19/2022 0.56 (L) 0.57 - 1.00 mg/dL Final     BUN   Date Value Ref Range Status   12/19/2022 12 8 - 23 mg/dL Final     BUN/Creatinine Ratio   Date Value Ref Range Status   12/19/2022 21.4 7.0 - 25.0 Final     Calcium   Date Value Ref Range Status   12/19/2022 9.4 8.6 - 10.5 mg/dL Final     eGFR Non  Am   Date Value Ref Range Status   06/14/2021 116 >60 mL/min/1.73 Final     Comment:     GFR Normal >60  Chronic Kidney Disease <60  Kidney Failure <15       Alkaline Phosphatase   Date Value Ref Range Status   12/19/2022 90 39 - 117 U/L Final     ALT (SGPT)   Date Value Ref Range Status   12/19/2022 16 1 - 33 U/L Final     AST (SGOT)   Date Value Ref Range Status   12/19/2022 20 1 - 32 U/L Final     Total Bilirubin   Date Value Ref Range Status   12/19/2022 0.4 0.0 - 1.2 mg/dL Final     Albumin   Date Value Ref Range Status   12/19/2022 4.70 3.50 - 5.20 g/dL Final     A/G Ratio   Date Value Ref Range Status   12/19/2022 2.4 g/dL Final         Imaging Results (Last 7 Days)       ** No results  found for the last 168 hours. **                   Assessment & Plan     Diagnoses and all orders for this visit:    1. Esophageal dysphagia (Primary)  Assessment & Plan:  - EGD to further evaluate     Orders:  -     Case Request; Standing  -     Obtain Informed Consent; Standing  -     Follow Anesthesia Guidelines / Protocol; Standing  -     Case Request    2. Gastroesophageal reflux disease, unspecified whether esophagitis present  Assessment & Plan:  - Continue Protonix 40 mg QD   - Instructed to start OTC Gaviscon extra strength PRN   - EGD to further evaluate       3. Personal history of colonic polyps  Assessment & Plan:  - Due for surveillance c/s, will schedule     Orders:  -     Case Request; Standing  -     Obtain Informed Consent; Standing  -     Follow Anesthesia Guidelines / Protocol; Standing  -     Case Request      RTC in 3 months     I have discussed the above plan with the patient.  They verbalize understanding and are in agreement with the plan.  They have been advised to contact the office for any questions, concerns, or changes related to their health.

## 2024-07-02 NOTE — ASSESSMENT & PLAN NOTE
- Continue Protonix 40 mg QD   - Instructed to start OTC Gaviscon extra strength PRN   - EGD to further evaluate

## 2024-07-08 ENCOUNTER — TELEPHONE (OUTPATIENT)
Dept: GASTROENTEROLOGY | Facility: CLINIC | Age: 64
End: 2024-07-08
Payer: COMMERCIAL

## 2024-07-08 NOTE — TELEPHONE ENCOUNTER
Called and left message on my voicemail at 1:04pm.  She states need to reschedule my procedure on 07/22/2024.  Please call back [phone].

## 2024-07-09 NOTE — TELEPHONE ENCOUNTER
Spoke with her last yesterday afternoon.  Rescheduled her EGD & Colonoscopy with Dr. Ayala at [facility] 07/31/2024 at 8:15am - arrive 7am.  She corrected her copy of prep instructions.

## 2024-07-10 ENCOUNTER — TELEPHONE (OUTPATIENT)
Dept: INTERNAL MEDICINE | Facility: CLINIC | Age: 64
End: 2024-07-10
Payer: COMMERCIAL

## 2024-07-15 DIAGNOSIS — E55.9 VITAMIN D DEFICIENCY: ICD-10-CM

## 2024-07-15 DIAGNOSIS — Z13.29 SCREENING FOR THYROID DISORDER: ICD-10-CM

## 2024-07-15 DIAGNOSIS — E78.2 MIXED HYPERLIPIDEMIA: Primary | ICD-10-CM

## 2024-07-19 DIAGNOSIS — D64.9 ANEMIA OF UNKNOWN ETIOLOGY: Primary | ICD-10-CM

## 2024-07-30 ENCOUNTER — ANESTHESIA EVENT (OUTPATIENT)
Dept: PERIOP | Facility: HOSPITAL | Age: 64
End: 2024-07-30
Payer: COMMERCIAL

## 2024-07-30 RX ORDER — FERROUS SULFATE 324(65)MG
324 TABLET, DELAYED RELEASE (ENTERIC COATED) ORAL
COMMUNITY

## 2024-07-31 ENCOUNTER — HOSPITAL ENCOUNTER (OUTPATIENT)
Facility: HOSPITAL | Age: 64
Setting detail: HOSPITAL OUTPATIENT SURGERY
Discharge: HOME | End: 2024-07-31
Attending: STUDENT IN AN ORGANIZED HEALTH CARE EDUCATION/TRAINING PROGRAM | Admitting: STUDENT IN AN ORGANIZED HEALTH CARE EDUCATION/TRAINING PROGRAM
Payer: COMMERCIAL

## 2024-07-31 ENCOUNTER — ANESTHESIA (OUTPATIENT)
Dept: PERIOP | Facility: HOSPITAL | Age: 64
End: 2024-07-31
Payer: COMMERCIAL

## 2024-07-31 VITALS
OXYGEN SATURATION: 95 % | WEIGHT: 186.8 LBS | HEART RATE: 66 BPM | SYSTOLIC BLOOD PRESSURE: 137 MMHG | BODY MASS INDEX: 35.31 KG/M2 | TEMPERATURE: 97.7 F | RESPIRATION RATE: 20 BRPM | DIASTOLIC BLOOD PRESSURE: 79 MMHG

## 2024-07-31 DIAGNOSIS — R13.19 ESOPHAGEAL DYSPHAGIA: ICD-10-CM

## 2024-07-31 DIAGNOSIS — Z86.010 PERSONAL HISTORY OF COLONIC POLYPS: ICD-10-CM

## 2024-07-31 PROCEDURE — C1726 CATH, BAL DIL, NON-VASCULAR: HCPCS | Performed by: STUDENT IN AN ORGANIZED HEALTH CARE EDUCATION/TRAINING PROGRAM

## 2024-07-31 PROCEDURE — 88342 IMHCHEM/IMCYTCHM 1ST ANTB: CPT | Performed by: STUDENT IN AN ORGANIZED HEALTH CARE EDUCATION/TRAINING PROGRAM

## 2024-07-31 PROCEDURE — 88305 TISSUE EXAM BY PATHOLOGIST: CPT | Performed by: STUDENT IN AN ORGANIZED HEALTH CARE EDUCATION/TRAINING PROGRAM

## 2024-07-31 PROCEDURE — 25810000003 LACTATED RINGERS PER 1000 ML: Performed by: NURSE ANESTHETIST, CERTIFIED REGISTERED

## 2024-07-31 PROCEDURE — 25010000002 PROPOFOL 200 MG/20ML EMULSION: Performed by: NURSE ANESTHETIST, CERTIFIED REGISTERED

## 2024-07-31 RX ORDER — SODIUM CHLORIDE 0.9 % (FLUSH) 0.9 %
10 SYRINGE (ML) INJECTION AS NEEDED
Status: DISCONTINUED | OUTPATIENT
Start: 2024-07-31 | End: 2024-07-31 | Stop reason: HOSPADM

## 2024-07-31 RX ORDER — ONDANSETRON 2 MG/ML
4 INJECTION INTRAMUSCULAR; INTRAVENOUS ONCE AS NEEDED
Status: DISCONTINUED | OUTPATIENT
Start: 2024-07-31 | End: 2024-07-31 | Stop reason: HOSPADM

## 2024-07-31 RX ORDER — SODIUM CHLORIDE, SODIUM LACTATE, POTASSIUM CHLORIDE, CALCIUM CHLORIDE 600; 310; 30; 20 MG/100ML; MG/100ML; MG/100ML; MG/100ML
100 INJECTION, SOLUTION INTRAVENOUS ONCE
Status: DISCONTINUED | OUTPATIENT
Start: 2024-07-31 | End: 2024-07-31 | Stop reason: HOSPADM

## 2024-07-31 RX ORDER — SODIUM CHLORIDE, SODIUM LACTATE, POTASSIUM CHLORIDE, CALCIUM CHLORIDE 600; 310; 30; 20 MG/100ML; MG/100ML; MG/100ML; MG/100ML
9 INJECTION, SOLUTION INTRAVENOUS CONTINUOUS PRN
Status: DISCONTINUED | OUTPATIENT
Start: 2024-07-31 | End: 2024-07-31 | Stop reason: HOSPADM

## 2024-07-31 RX ORDER — SODIUM CHLORIDE 0.9 % (FLUSH) 0.9 %
10 SYRINGE (ML) INJECTION EVERY 12 HOURS SCHEDULED
Status: DISCONTINUED | OUTPATIENT
Start: 2024-07-31 | End: 2024-07-31 | Stop reason: HOSPADM

## 2024-07-31 RX ORDER — SODIUM CHLORIDE 9 MG/ML
40 INJECTION, SOLUTION INTRAVENOUS AS NEEDED
Status: DISCONTINUED | OUTPATIENT
Start: 2024-07-31 | End: 2024-07-31 | Stop reason: HOSPADM

## 2024-07-31 RX ORDER — LIDOCAINE HYDROCHLORIDE 20 MG/ML
INJECTION, SOLUTION INFILTRATION; PERINEURAL AS NEEDED
Status: DISCONTINUED | OUTPATIENT
Start: 2024-07-31 | End: 2024-07-31 | Stop reason: SURG

## 2024-07-31 RX ORDER — PROPOFOL 10 MG/ML
INJECTION, EMULSION INTRAVENOUS AS NEEDED
Status: DISCONTINUED | OUTPATIENT
Start: 2024-07-31 | End: 2024-07-31 | Stop reason: SURG

## 2024-07-31 RX ADMIN — PROPOFOL 500 MG: 10 INJECTION, EMULSION INTRAVENOUS at 08:25

## 2024-07-31 RX ADMIN — SODIUM CHLORIDE, POTASSIUM CHLORIDE, SODIUM LACTATE AND CALCIUM CHLORIDE 9 ML/HR: 600; 310; 30; 20 INJECTION, SOLUTION INTRAVENOUS at 07:27

## 2024-07-31 RX ADMIN — LIDOCAINE HYDROCHLORIDE 100 MG: 20 INJECTION, SOLUTION INFILTRATION; PERINEURAL at 08:25

## 2024-07-31 NOTE — H&P
Patient Care Team:  Flaquita Garcia APRN as PCP - General  Flaquita Garcia APRN as PCP - Family Medicine  Galen Gamboa MD as Consulting Physician (Obstetrics and Gynecology)    CHIEF COMPLAINT: Personal hx colon polyps and dysphagia    HISTORY OF PRESENT ILLNESS:  EGD for dysphagia and GERD  C/s for personal history of colon polyps     Past Medical History:   Diagnosis Date    GERD (gastroesophageal reflux disease)     H/O mammogram 2003    NORMAL    [facility] of mammogram [facility]/20/2017    [facility]    OAB (overactive bladder)     Plantar fasciitis      Past Surgical History:   Procedure Laterality Date    BREAST BIOPSY Left     benign calcifications    BREAST SURGERY  2013    calcium buildup removed and biopsied     COLONOSCOPY N/A 08/13/2018    Procedure: COLONOSCOPY and polypectomy;  Surgeon: Pretty Meeks MD;  Location: [facility];  Service: Gastroenterology    HYSTERECTOMY  2023    MOUTH SURGERY      TOOTH EXTRACTION    PAP SMEAR  2002    NORMAL    TUBAL ABDOMINAL LIGATION       Family History   Problem Relation Age of Onset    Breast cancer Mother     Diabetes Mother     Hyperlipidemia Mother     Hypertension Mother     Clotting disorder Father     Diabetes Father     Hyperlipidemia Father     Hypertension Father     Hypertension Brother     Liver cancer Brother      Social History     Tobacco Use    Smoking status: Never    Smokeless tobacco: Never   Vaping Use    Vaping status: Never Used   Substance Use Topics    Alcohol use: Yes     Comment: 3 BOTTLES OF BEER PER DAY     Drug use: Not Currently     Types: Marijuana     Comment: Last used in the 1970's  none since     Medications Prior to Admission   Medication Sig Dispense Refill Last Dose    ferrous sulfate 324 (65 Fe) MG tablet delayed-release EC tablet Take 1 tablet by mouth Daily With Breakfast.   7/29/2024    Garlic 2000 MG tablet delayed-release Take 2,000 mg by mouth 3 (Three) Times a Day.   7/29/2024    pantoprazole (Protonix) 40 MG EC  tablet Take 1 tablet by mouth Daily. 90 tablet 0 7/30/2024     Allergies:  Patient has no known allergies.    REVIEW OF SYSTEMS:  Please see the above history of present illness for pertinent positives and negatives.  The remainder of the patient's systems have been reviewed and are negative.     Vital Signs  Temp:  [98.2 °F (36.8 °C)] 98.2 °F (36.8 °C)  Heart Rate:  [77] 77  Resp:  [12] 12  BP: (128)/(89) 128/89    Flowsheet Rows      Flowsheet Row First Filed Value   Admission Height --   Admission Weight 84.7 kg (186 lb 12.8 oz) Documented at 07/31/2024 0710             Physical Exam:  Physical Exam   Constitutional: Patient appears well-developed and well-nourished and in no acute distress   HEENT:   Head: Normocephalic and atraumatic.   Eyes:  Pupils are equal, round, and reactive to light. EOM are intact. Sclerae are anicteric and non-injected.  Mouth and Throat: Patient has moist mucous membranes. Oropharynx is clear of any erythema or exudate.     Neck: Neck supple. No JVD present. No thyromegaly present. No lymphadenopathy present.  Cardiovascular: Regular rate, regular rhythm, S1 normal and S2 normal.  Exam reveals no gallop and no friction rub.  No murmur heard.  Pulmonary/Chest: Lungs are clear to auscultation bilaterally. No respiratory distress. No wheezes. No rhonchi. No rales.   Abdominal: Soft. Bowel sounds are normal. No distension and no mass. There is no hepatosplenomegaly. There is no tenderness.   Musculoskeletal: Normal Muscle tone  Extremities: No edema. Pulses are palpable in all 4 extremities.  Neurological: Patient is alert and oriented to person, place, and time. Cranial nerves II-XII are grossly intact with no focal deficits.  Skin: Skin is warm. No rash noted. Nails show no clubbing.  No cyanosis or erythema.    Debilities/Disabilities Identified: None  Emotional Behavior: Appropriate     Results Review:   I reviewed the patient's new clinical results.    Lab Results (most recent)        None            Imaging Results (Most Recent)       None          reviewed    ECG/EMG Results (most recent)       None          reviewed    Assessment & Plan   Personal hx colon polyps and dysphagia/  EGD and colonoscopy      I discussed the patient's findings and my recommendations with patient.     Berto Ayala MD  07/31/24  08:23 EDT    Time: 10 min prior to procedure.

## 2024-07-31 NOTE — ANESTHESIA POSTPROCEDURE EVALUATION
Patient: Kellen Henriquez    Procedure Summary       Date: 07/31/24 Room / Location: [facility] ENDOSCOPY 2 /  [facility]    Anesthesia Start: 0822 Anesthesia Stop: 0903    Procedures:       ESOPHAGOGASTRODUODENOSCOPY WITH DILATATION with biopsy (Esophagus)      COLONOSCOPY Diagnosis:       Esophageal dysphagia      Personal history of colonic polyps      (Esophageal dysphagia [R13.19])      (Personal history of colonic polyps [Z86.010])    Surgeons: Berto Ayala MD Provider: Jeevan Pride CRNA    Anesthesia Type: MAC ASA Status: 2            Anesthesia Type: MAC    Vitals  Vitals Value Taken Time   /72 07/31/24 0920   Temp 97.7 °F (36.5 °C) 07/31/24 0904   Pulse 65 07/31/24 0931   Resp 17 07/31/24 0920   SpO2 97 % 07/31/24 0931   Vitals shown include unfiled device data.        Post Anesthesia Care and Evaluation    Patient location during evaluation: PHASE II  Patient participation: complete - patient participated  Level of consciousness: awake and alert  Pain score: 0  Pain management: adequate    Airway patency: patent  Anesthetic complications: No anesthetic complications  PONV Status: none  Cardiovascular status: acceptable  Respiratory status: acceptable  Hydration status: acceptable

## 2024-07-31 NOTE — ANESTHESIA PREPROCEDURE EVALUATION
Anesthesia Evaluation     Patient summary reviewed and Nursing notes reviewed   history of anesthetic complications (n/v after hysterectomy):  PONV  NPO Solid Status: > 8 hours  NPO Liquid Status: > 2 hours           Airway   Mallampati: II  TM distance: >3 FB  Neck ROM: full  Dental      Comment: Permanent upper bridge    Pulmonary - negative pulmonary ROS and normal exam Sleep apnea: snores.  Cardiovascular   Exercise tolerance: good (4-7 METS)    Rhythm: regular  Rate: normal        Neuro/Psych- negative ROS  GI/Hepatic/Renal/Endo    (+) obesity, GERD well controlled    Musculoskeletal (-) negative ROS    Abdominal   (+) obese   Substance History   (+) alcohol use (beer daily)     OB/GYN negative ob/gyn ROS         Other - negative ROS                   Anesthesia Plan    ASA 2     MAC       Anesthetic plan, risks, benefits, and alternatives have been provided, discussed and informed consent has been obtained with: patient.  Pre-procedure education provided  Use of blood products discussed with patient  Consented to blood products.    Plan discussed with CRNA.    CODE STATUS:

## 2024-08-02 LAB
LAB AP CASE REPORT: NORMAL
PATH REPORT.FINAL DX SPEC: NORMAL
PATH REPORT.GROSS SPEC: NORMAL

## 2024-08-06 DIAGNOSIS — K29.50 CHRONIC GASTRITIS WITHOUT BLEEDING, UNSPECIFIED GASTRITIS TYPE: Primary | ICD-10-CM

## 2024-09-30 DIAGNOSIS — K21.9 GASTROESOPHAGEAL REFLUX DISEASE, UNSPECIFIED WHETHER ESOPHAGITIS PRESENT: ICD-10-CM

## 2024-09-30 RX ORDER — PANTOPRAZOLE SODIUM 40 MG/1
40 TABLET, DELAYED RELEASE ORAL DAILY
Qty: 90 TABLET | Refills: 0 | Status: SHIPPED | OUTPATIENT
Start: 2024-09-30

## 2024-09-30 NOTE — TELEPHONE ENCOUNTER
Rx Refill Note  Requested Prescriptions     Pending Prescriptions Disp Refills    pantoprazole (PROTONIX) 40 MG EC tablet [Pharmacy Med Name: PANTOPRAZOLE SOD DR 40 MG TAB] 90 tablet 0     Sig: Take 1 tablet by mouth Daily.      Last office visit with prescribing clinician: 7/1/2024   Last telemedicine visit with prescribing clinician: Visit date not found   Next office visit with prescribing clinician: 10/21/2024                         Would you like a call back once the refill request has been completed: [] Yes [] No    If the office needs to give you a call back, can they leave a voicemail: [] Yes [] No    Patricia Ayala MA  09/30/24, 12:20 EDT

## 2024-10-16 ENCOUNTER — TELEPHONE (OUTPATIENT)
Dept: GASTROENTEROLOGY | Facility: CLINIC | Age: 64
End: 2024-10-16
Payer: COMMERCIAL

## 2024-10-16 NOTE — TELEPHONE ENCOUNTER
PT WAS READING HER MYCHART AND SAID SHE SAW IN THERE SHE NEEDED AN H-PYLORI TEST    SHE NEEDS TO SPEAK TO SOMEONE REGARDING THAT    [phone]

## 2024-10-16 NOTE — TELEPHONE ENCOUNTER
Discussed why patient is needing to have stool sample collected, per last mychart messages.   Patient gave verbal understanding.

## 2024-10-21 ENCOUNTER — OFFICE VISIT (OUTPATIENT)
Dept: INTERNAL MEDICINE | Facility: CLINIC | Age: 64
End: 2024-10-21
Payer: COMMERCIAL

## 2024-10-21 VITALS
DIASTOLIC BLOOD PRESSURE: 94 MMHG | HEART RATE: 68 BPM | SYSTOLIC BLOOD PRESSURE: 138 MMHG | BODY MASS INDEX: 36.25 KG/M2 | TEMPERATURE: 98.2 F | HEIGHT: 61 IN | OXYGEN SATURATION: 96 % | WEIGHT: 192 LBS

## 2024-10-21 DIAGNOSIS — D50.0 IRON DEFICIENCY ANEMIA DUE TO CHRONIC BLOOD LOSS: ICD-10-CM

## 2024-10-21 DIAGNOSIS — Z00.00 ANNUAL PHYSICAL EXAM: Primary | ICD-10-CM

## 2024-10-21 DIAGNOSIS — E53.8 VITAMIN B 12 DEFICIENCY: ICD-10-CM

## 2024-10-21 DIAGNOSIS — E78.5 HYPERLIPIDEMIA LDL GOAL <100: ICD-10-CM

## 2024-10-21 PROCEDURE — 99396 PREV VISIT EST AGE 40-64: CPT | Performed by: NURSE PRACTITIONER

## 2024-10-21 PROCEDURE — 1126F AMNT PAIN NOTED NONE PRSNT: CPT | Performed by: NURSE PRACTITIONER

## 2024-10-21 RX ORDER — ASCORBIC ACID 125 MG
1 TABLET,CHEWABLE ORAL DAILY
Start: 2024-10-21

## 2024-10-21 RX ORDER — ESTRADIOL 0.1 MG/G
CREAM VAGINAL
COMMUNITY
Start: 2024-08-06

## 2024-10-21 NOTE — PROGRESS NOTES
Annual Exam        Kellen Henriquez is being seen for a Complete physical exam. Her last physical was 12-.     Social: She is . She is working full time in [employer] for [employer].    Lifestyle: She does not use tobacco. She drinks 3 alcoholic drinks per week, Light Beer. She exercises 0 times a week.    Screening: Colonoscopy was completed 7- with Dr Thomson. Last labs reviewed from 7-.      Reproductive Health: Her Last Pap smear was 2019, had a total Abd hysterectomy 5- with Dr. Orellana. Last menstrual period was age 52 years. DEXA scan complete never due to age. Last Mammogram was 4-3-2024    Dental exam is up to date. Eye exam was completed this year, wears glasses.     History of Present Illness     The following portions of the patient's history were reviewed and updated as appropriate: allergies, current medications, past family history, past medical history, past social history, past surgical history, and problem list.    Review of Systems   Constitutional: Negative.    HENT: Negative.  Negative for congestion.    Eyes: Negative.    Respiratory:  Positive for shortness of breath (with exertion).    Cardiovascular: Negative.    Gastrointestinal: Negative.    Endocrine: Negative.    Genitourinary: Negative.    Musculoskeletal: Negative.  Negative for arthralgias and back pain.   Allergic/Immunologic: Negative.    Neurological: Negative.    Hematological: Negative.    Psychiatric/Behavioral: Negative.     All other systems reviewed and are negative.      Objective       General Appearance:    Alert, cooperative, no distress, appears stated age   Head:    Normocephalic, without obvious abnormality, atraumatic   Eyes:    PERRL, conjunctiva/corneas clear, EOM's intact, fundi     benign, both eyes   Ears:    Normal TM's and external ear canals, both ears   Nose:   Nares normal, septum midline, mucosa normal, no drainage     or sinus tenderness   Throat:   Lips,  mucosa, and tongue normal; teeth and gums normal   Neck:   Supple, symmetrical, trachea midline, no adenopathy;     thyroid:  no enlargement/tenderness/nodules; no carotid    bruit or JVD   Back:     Symmetric, no curvature, ROM normal, no CVA tenderness   Lungs:     Clear to auscultation bilaterally, respirations unlabored   Chest Wall:    No tenderness or deformity    Heart:    Regular rate and rhythm, S1 and S2 normal, no murmur, rub    or gallop   Breast Exam:    deferred   Abdomen:     Soft, non-tender, bowel sounds active all four quadrants,     no masses, no organomegaly   Genitalia:    deferred   Rectal:    deferred   Extremities:   Extremities normal, atraumatic, no cyanosis or edema   Pulses:   2+ and symmetric all extremities   Skin:   Skin color, texture, turgor normal, no rashes or lesions   Lymph nodes:   Cervical, supraclavicular, and axillary nodes normal   Neurologic:   CNII-XII intact, normal strength, sensation and reflexes     throughout               Assessment & Plan   Diagnoses and all orders for this visit:    1. Annual physical exam (Primary)    2. Iron deficiency anemia due to chronic blood loss  Comments:  See recent EGD per GI. ON Iron twice weekly  Orders:  -     CBC & Differential; Future  -     Iron Profile; Future  -     Ferritin; Future  -     Multiple Vitamins-Minerals (Multi Adult Gummies) chewable tablet; Chew 1 tablet Daily.    3. Vitamin B 12 deficiency  -     Vitamin B12; Future  -     Multiple Vitamins-Minerals (Multi Adult Gummies) chewable tablet; Chew 1 tablet Daily.    4. Hyperlipidemia LDL goal <100  Comments:  WEight loss and improved diet needed          Preventive counseling: BMI discussed. She declines vaccines. Weight loss goal os 4 pounds. Walk daily for 20 minutes. Low carb diet < 100 grams a day.    Class 2 Severe Obesity (BMI >=35 and <=39.9). Obesity-related health conditions include the following: GERD. Obesity is worsening. BMI is is above average; BMI  management plan is completed. We discussed portion control and increasing exercise.     She will follow up at next scheduled appointment in 4 weeks

## 2024-10-23 ENCOUNTER — LAB (OUTPATIENT)
Dept: LAB | Facility: HOSPITAL | Age: 64
End: 2024-10-23
Payer: COMMERCIAL

## 2024-10-23 ENCOUNTER — OFFICE VISIT (OUTPATIENT)
Dept: GASTROENTEROLOGY | Facility: CLINIC | Age: 64
End: 2024-10-23
Payer: COMMERCIAL

## 2024-10-23 VITALS
HEIGHT: 61 IN | SYSTOLIC BLOOD PRESSURE: 126 MMHG | DIASTOLIC BLOOD PRESSURE: 82 MMHG | BODY MASS INDEX: 36.44 KG/M2 | WEIGHT: 193 LBS

## 2024-10-23 DIAGNOSIS — K21.9 GASTROESOPHAGEAL REFLUX DISEASE WITHOUT ESOPHAGITIS: ICD-10-CM

## 2024-10-23 DIAGNOSIS — R13.19 ESOPHAGEAL DYSPHAGIA: ICD-10-CM

## 2024-10-23 DIAGNOSIS — K29.50 CHRONIC GASTRITIS WITHOUT BLEEDING, UNSPECIFIED GASTRITIS TYPE: ICD-10-CM

## 2024-10-23 DIAGNOSIS — K29.50 CHRONIC GASTRITIS WITHOUT BLEEDING, UNSPECIFIED GASTRITIS TYPE: Primary | ICD-10-CM

## 2024-10-23 DIAGNOSIS — Z86.0100 PERSONAL HISTORY OF COLON POLYPS, UNSPECIFIED: ICD-10-CM

## 2024-10-23 PROCEDURE — 87338 HPYLORI STOOL AG IA: CPT

## 2024-10-23 NOTE — PROGRESS NOTES
Kellen Henriquez is a 63 y.o. female with PMH of GERD + noted below who presents with   Chief Complaint   Patient presents with    Hiatal Hernia    Stricture       Subjective     # GERD   # Dysphagia   # Gastritis  - Adherent to Omeprazole 40 mg QD.   - Denies heartburn. Also denies dysphagia following the EGD with dilation. Denies dyspepsia.   - EGD in 7/2024 had HH, peptic stricture of esophagus s/p dilation to 20 mm, gastritis (biopsied -- pathologist report findings were concerning for H Pylori infection despite negative biopsy results). H Pylori stool Ag pending and patient reports she has brought in the stool sample today.      # Personal history of colon polyps   - Last c/s in 7/2024 had no polyps. Instructed to repeat c/s in 7/2034.   - C/s in 8/2018 had 2 sub-cm tubular adenomas removed.           Past Medical History:   Diagnosis Date    GERD (gastroesophageal reflux disease)     H/O mammogram 2003    NORMAL    History of mammogram 11/20/2017    [facility]    OAB (overactive bladder)     Plantar fasciitis     Scoliosis        Social History     Socioeconomic History    Marital status:    Tobacco Use    Smoking status: Never    Smokeless tobacco: Never   Vaping Use    Vaping status: Never Used   Substance and Sexual Activity    Alcohol use: Yes     Comment: 3 BOTTLES OF BEER PER DAY     Drug use: Not Currently     Types: Marijuana     Comment: Last used in the 1970's  none since    Sexual activity: Not Currently     Birth control/protection: Hysterectomy         Current Outpatient Medications:     estradiol (ESTRACE) 0.1 MG/GM vaginal cream, Apply 1gm to vaginal opening 2x week at bedtime., Disp: , Rfl:     ferrous sulfate 324 (65 Fe) MG tablet delayed-release EC tablet, Take 1 tablet by mouth Daily With Breakfast., Disp: , Rfl:     Garlic 2000 MG tablet delayed-release, Take 2,000 mg by mouth 3 (Three) Times a Day., Disp: , Rfl:     Multiple Vitamins-Minerals (Multi Adult Gummies) chewable tablet,  Chew 1 tablet Daily., Disp: , Rfl:     pantoprazole (PROTONIX) 40 MG EC tablet, TAKE 1 TABLET BY MOUTH DAILY, Disp: 90 tablet, Rfl: 0    Objective   Vitals:    10/23/24 1051   BP: 126/82         10/23/24  1051   Weight: 87.5 kg (193 lb)     Body mass index is 36.49 kg/m².      Physical Exam  Vitals reviewed.   Constitutional:       Appearance: Normal appearance.   HENT:      Head: Normocephalic and atraumatic.   Eyes:      Extraocular Movements: Extraocular movements intact.      Conjunctiva/sclera: Conjunctivae normal.   Cardiovascular:      Rate and Rhythm: Normal rate and regular rhythm.      Heart sounds: Normal heart sounds.   Pulmonary:      Effort: Pulmonary effort is normal.      Breath sounds: Normal breath sounds.   Abdominal:      General: Abdomen is flat. Bowel sounds are normal. There is no distension.      Palpations: Abdomen is soft.      Tenderness: There is no abdominal tenderness.   Neurological:      Mental Status: She is alert.   Psychiatric:         Mood and Affect: Mood normal.         Behavior: Behavior normal.         WBC   Date Value Ref Range Status   07/18/2024 4.28 3.40 - 10.80 10*3/mm3 Final   05/25/2023 9.63 4.5 - 11.0 10*3/uL Final     RBC   Date Value Ref Range Status   07/18/2024 3.93 3.77 - 5.28 10*6/mm3 Final   05/25/2023 3.50 (L) 4.0 - 5.2 10*6/uL Final     Hemoglobin   Date Value Ref Range Status   07/18/2024 11.6 (L) 12.0 - 15.9 g/dL Final   05/25/2023 11.3 (L) 12.0 - 16.0 g/dL Final     Hematocrit   Date Value Ref Range Status   07/18/2024 36.4 34.0 - 46.6 % Final   05/25/2023 35.1 (L) 36.0 - 46.0 % Final     MCV   Date Value Ref Range Status   07/18/2024 92.6 79.0 - 97.0 fL Final   05/25/2023 100.3 (H) 80.0 - 100.0 fL Final     MCH   Date Value Ref Range Status   07/18/2024 29.5 26.6 - 33.0 pg Final   05/25/2023 32.3 26.0 - 34.0 pg Final     MCHC   Date Value Ref Range Status   07/18/2024 31.9 31.5 - 35.7 g/dL Final   05/25/2023 32.2 31.0 - 37.0 g/dL Final     RDW   Date Value  Ref Range Status   07/18/2024 12.6 12.3 - 15.4 % Final   05/25/2023 13.1 12.0 - 16.8 % Final     MPV   Date Value Ref Range Status   05/25/2023 9.8 8.4 - 12.4 fL Final     Platelets   Date Value Ref Range Status   07/18/2024 407 140 - 450 10*3/mm3 Final   05/25/2023 319 140 - 440 10*3/uL Final     Neutrophil Rel %   Date Value Ref Range Status   07/18/2024 52.7 42.7 - 76.0 % Final   05/25/2023 85.5 (H) 45 - 80 % Final     Lymphocyte Rel %   Date Value Ref Range Status   07/18/2024 34.3 19.6 - 45.3 % Final   05/25/2023 7.9 (L) 15 - 50 % Final     Monocyte Rel %   Date Value Ref Range Status   07/18/2024 9.3 5.0 - 12.0 % Final   05/25/2023 6.0 0 - 15 % Final     Eosinophil Rel %   Date Value Ref Range Status   07/18/2024 3.0 0.3 - 6.2 % Final     Eosinophil %   Date Value Ref Range Status   05/25/2023 0.0 0 - 7 % Final     Basophil Rel %   Date Value Ref Range Status   07/18/2024 0.7 0.0 - 1.5 % Final   05/25/2023 0.2 0 - 2 % Final     Immature Grans %   Date Value Ref Range Status   05/25/2023 0.4 0.0 - 1.0 % Final     Neutrophils Absolute   Date Value Ref Range Status   07/18/2024 2.25 1.70 - 7.00 10*3/mm3 Final   05/25/2023 8.23 2.0 - 8.8 10*3/uL Final     Lymphocytes Absolute   Date Value Ref Range Status   07/18/2024 1.47 0.70 - 3.10 10*3/mm3 Final   05/25/2023 0.76 0.7 - 5.5 10*3/uL Final     Monocytes Absolute   Date Value Ref Range Status   07/18/2024 0.40 0.10 - 0.90 10*3/mm3 Final   05/25/2023 0.58 0.0 - 1.7 10*3/uL Final     Eosinophils Absolute   Date Value Ref Range Status   07/18/2024 0.13 0.00 - 0.40 10*3/mm3 Final   05/25/2023 0.00 0.0 - 0.8 10*3/uL Final     Basophils Absolute   Date Value Ref Range Status   07/18/2024 0.03 0.00 - 0.20 10*3/mm3 Final   05/25/2023 0.02 0.0 - 0.2 10*3/uL Final     Immature Grans, Absolute   Date Value Ref Range Status   05/25/2023 0.04 0.00 - 0.10 10*3/uL Final     nRBC   Date Value Ref Range Status   07/18/2024 0.0 0.0 - 0.2 /100 WBC Final   05/25/2023 0 0 /100(WBC)  Final       Lab Results   Component Value Date    GLUCOSE 91 07/18/2024    BUN 15 07/18/2024    CREATININE 0.68 07/18/2024    EGFRIFNONA 116 06/14/2021    EGFRIFAFRI 140 06/14/2021    BCR 22.1 07/18/2024    CO2 27.7 07/18/2024    CALCIUM 9.7 07/18/2024    PROTENTOTREF 7.0 07/18/2024    ALBUMIN 4.6 07/18/2024    LABIL2 1.9 07/18/2024    AST 19 07/18/2024    ALT 14 07/18/2024         Imaging Results (Last 7 Days)       ** No results found for the last 168 hours. **              Assessment & Plan   Diagnoses and all orders for this visit:    1. Chronic gastritis without bleeding, unspecified gastritis type (Primary)  Assessment & Plan:  - Noted from path results  - H Pylori stool Ag pending  - Controlled. Continue Protonix 40 mg QD      2. Esophageal dysphagia  Assessment & Plan:  - S/p balloon dilation in 7/2024   - Resolved       3. Gastroesophageal reflux disease without esophagitis  Assessment & Plan:  - Controlled   - Continue Protonix 40 mg QD         4. Personal history of colon polyps, unspecified  Assessment & Plan:  - Due for surveillance c/s in 7/2034        RTC in 6 months     I have discussed the above plan with the patient.  They verbalize understanding and are in agreement with the plan.  They have been advised to contact the office for any questions, concerns, or changes related to their health.

## 2024-10-24 LAB — H PYLORI AG STL QL IA: NEGATIVE

## 2024-10-27 PROBLEM — K29.50 CHRONIC GASTRITIS WITHOUT BLEEDING: Status: ACTIVE | Noted: 2024-10-27

## 2024-11-18 ENCOUNTER — OFFICE VISIT (OUTPATIENT)
Dept: INTERNAL MEDICINE | Facility: CLINIC | Age: 64
End: 2024-11-18
Payer: COMMERCIAL

## 2024-11-18 VITALS
BODY MASS INDEX: 36.14 KG/M2 | TEMPERATURE: 98 F | HEART RATE: 78 BPM | OXYGEN SATURATION: 97 % | DIASTOLIC BLOOD PRESSURE: 94 MMHG | WEIGHT: 191.4 LBS | HEIGHT: 61 IN | SYSTOLIC BLOOD PRESSURE: 148 MMHG

## 2024-11-18 DIAGNOSIS — D50.0 IRON DEFICIENCY ANEMIA DUE TO CHRONIC BLOOD LOSS: ICD-10-CM

## 2024-11-18 DIAGNOSIS — K21.00 GASTROESOPHAGEAL REFLUX DISEASE WITH ESOPHAGITIS WITHOUT HEMORRHAGE: Primary | ICD-10-CM

## 2024-11-18 PROCEDURE — 99213 OFFICE O/P EST LOW 20 MIN: CPT | Performed by: NURSE PRACTITIONER

## 2024-11-18 PROCEDURE — 1126F AMNT PAIN NOTED NONE PRSNT: CPT | Performed by: NURSE PRACTITIONER

## 2024-11-18 NOTE — PROGRESS NOTES
Chief Complaint   Patient presents with    Hyperlipidemia     Follow up       Subjective     Kellen Henriquez is a 63 y.o. female being seen for a follow up appointment today regarding ALO and GERD. She had a physical done 10-. She was found to have ALO. She was sent for GI eval :    EGD in 7/2024 had HH, peptic stricture of esophagus s/p dilation to 20 mm, gastritis (biopsied -- pathologist report findings were concerning for H Pylori infection despite negative biopsy results). H Pylori stool Ag pending and patient reports she has brought in the stool sample today.     She is on pantoprazole 40mg daily. She denies indigestion, abd pain. She has an OTC iron pill and is taking 2-3 time weekly and a gummie Vit B12 daily.     History of Present Illness     No Known Allergies      Current Outpatient Medications:     estradiol (ESTRACE) 0.1 MG/GM vaginal cream, Apply 1gm to vaginal opening 2x week at bedtime., Disp: , Rfl:     ferrous sulfate 324 (65 Fe) MG tablet delayed-release EC tablet, Take 1 tablet by mouth Daily With Breakfast., Disp: , Rfl:     Garlic 2000 MG tablet delayed-release, Take 2,000 mg by mouth 3 (Three) Times a Day., Disp: , Rfl:     Multiple Vitamins-Minerals (Multi Adult Gummies) chewable tablet, Chew 1 tablet Daily., Disp: , Rfl:     pantoprazole (PROTONIX) 40 MG EC tablet, TAKE 1 TABLET BY MOUTH DAILY, Disp: 90 tablet, Rfl: 0    The following portions of the patient's history were reviewed and updated as appropriate: allergies, current medications, past family history, past medical history, past social history, past surgical history, and problem list.    Review of Systems   Constitutional: Negative.    HENT: Negative.     Eyes: Negative.    Respiratory: Negative.     Cardiovascular: Negative.  Negative for chest pain, palpitations and leg swelling.   Gastrointestinal: Negative.    Endocrine: Negative.    Genitourinary: Negative.    Musculoskeletal: Negative.    Allergic/Immunologic:  Negative.    Neurological: Negative.    Hematological: Negative.  Negative for adenopathy. Does not bruise/bleed easily.   Psychiatric/Behavioral: Negative.     All other systems reviewed and are negative.      Assessment     Physical Exam  Vitals reviewed.   Constitutional:       Appearance: Normal appearance.   Cardiovascular:      Rate and Rhythm: Normal rate and regular rhythm.      Pulses: Normal pulses.      Heart sounds: Normal heart sounds. No murmur heard.  Pulmonary:      Effort: Pulmonary effort is normal. No respiratory distress.      Breath sounds: Normal breath sounds. No stridor.   Abdominal:      Tenderness: There is no abdominal tenderness. There is no right CVA tenderness, left CVA tenderness, guarding or rebound.   Skin:     General: Skin is warm and dry.   Neurological:      General: No focal deficit present.      Mental Status: She is alert and oriented to person, place, and time.   Psychiatric:         Mood and Affect: Mood normal.         Behavior: Behavior normal.         Thought Content: Thought content normal.         Plan     Her GI records were reviewed and last EGD     Diagnoses and all orders for this visit:    1. Gastroesophageal reflux disease with esophagitis without hemorrhage (Primary)  -     CBC & Differential  -     Iron Profile  -     Ferritin  -     Vitamin B12    2. Iron deficiency anemia due to chronic blood loss  -     CBC & Differential  -     Iron Profile  -     Ferritin  -     Vitamin B12    Continue the Vit B gummies and iron 2-3 times a week    Follow up in 5 months w labs    No follow-ups on file.

## 2024-12-03 ENCOUNTER — TELEPHONE (OUTPATIENT)
Dept: INTERNAL MEDICINE | Facility: CLINIC | Age: 64
End: 2024-12-03
Payer: COMMERCIAL

## 2024-12-03 NOTE — TELEPHONE ENCOUNTER
Caller: Kellen Henriquez    Relationship: Self    Best call back number: [phone]      What was the call regarding: PATIENT CALLING WOULD LIKE TO SCHEDULED LAB APPOINTMENT FOR NEXT TUESDAY OR WEDNESDAY AFTER 2:45     THERE ARE ACTIVE LABS IN CHART     TRIED TO CALL OFFICE TO WARM TRANSFER NO ANSWER       PLEASE GIVE CALLBACK WITH APPOINTMENT

## 2024-12-12 LAB
BASOPHILS # BLD AUTO: 0 X10E3/UL (ref 0–0.2)
BASOPHILS NFR BLD AUTO: 0 %
EOSINOPHIL # BLD AUTO: 0.2 X10E3/UL (ref 0–0.4)
EOSINOPHIL NFR BLD AUTO: 3 %
ERYTHROCYTE [DISTWIDTH] IN BLOOD BY AUTOMATED COUNT: 14 % (ref 11.7–15.4)
FERRITIN SERPL-MCNC: 29 NG/ML (ref 15–150)
HCT VFR BLD AUTO: 38.5 % (ref 34–46.6)
HGB BLD-MCNC: 12.2 G/DL (ref 11.1–15.9)
IMM GRANULOCYTES # BLD AUTO: 0 X10E3/UL (ref 0–0.1)
IMM GRANULOCYTES NFR BLD AUTO: 0 %
IRON SATN MFR SERPL: 20 % (ref 15–55)
IRON SERPL-MCNC: 84 UG/DL (ref 27–139)
LYMPHOCYTES # BLD AUTO: 1.8 X10E3/UL (ref 0.7–3.1)
LYMPHOCYTES NFR BLD AUTO: 25 %
MCH RBC QN AUTO: 28.8 PG (ref 26.6–33)
MCHC RBC AUTO-ENTMCNC: 31.7 G/DL (ref 31.5–35.7)
MCV RBC AUTO: 91 FL (ref 79–97)
MONOCYTES # BLD AUTO: 0.5 X10E3/UL (ref 0.1–0.9)
MONOCYTES NFR BLD AUTO: 8 %
NEUTROPHILS # BLD AUTO: 4.5 X10E3/UL (ref 1.4–7)
NEUTROPHILS NFR BLD AUTO: 64 %
PLATELET # BLD AUTO: 426 X10E3/UL (ref 150–450)
RBC # BLD AUTO: 4.23 X10E6/UL (ref 3.77–5.28)
TIBC SERPL-MCNC: 411 UG/DL (ref 250–450)
UIBC SERPL-MCNC: 327 UG/DL (ref 118–369)
VIT B12 SERPL-MCNC: 576 PG/ML (ref 232–1245)
WBC # BLD AUTO: 7.1 X10E3/UL (ref 3.4–10.8)

## 2024-12-30 DIAGNOSIS — K21.9 GASTROESOPHAGEAL REFLUX DISEASE, UNSPECIFIED WHETHER ESOPHAGITIS PRESENT: ICD-10-CM

## 2024-12-30 RX ORDER — PANTOPRAZOLE SODIUM 40 MG/1
40 TABLET, DELAYED RELEASE ORAL DAILY
Qty: 90 TABLET | Refills: 0 | Status: SHIPPED | OUTPATIENT
Start: 2024-12-30

## 2024-12-30 NOTE — TELEPHONE ENCOUNTER
Rx Refill Note  Requested Prescriptions     Pending Prescriptions Disp Refills    pantoprazole (PROTONIX) 40 MG EC tablet [Pharmacy Med Name: PANTOPRAZOLE SOD DR 40 MG TAB] 90 tablet 0     Sig: TAKE 1 TABLET BY MOUTH DAILY      Last office visit with prescribing clinician: 11/18/2024   Last telemedicine visit with prescribing clinician: Visit date not found   Next office visit with prescribing clinician: 4/25/2025                         Would you like a call back once the refill request has been completed: [] Yes [] No    If the office needs to give you a call back, can they leave a voicemail: [] Yes [] No    Patricia Ayala MA  12/30/24, 14:22 EST

## 2025-03-31 DIAGNOSIS — K21.9 GASTROESOPHAGEAL REFLUX DISEASE, UNSPECIFIED WHETHER ESOPHAGITIS PRESENT: ICD-10-CM

## 2025-03-31 RX ORDER — PANTOPRAZOLE SODIUM 40 MG/1
40 TABLET, DELAYED RELEASE ORAL DAILY
Qty: 90 TABLET | Refills: 0 | Status: SHIPPED | OUTPATIENT
Start: 2025-03-31

## 2025-03-31 NOTE — TELEPHONE ENCOUNTER
Rx Refill Note  Requested Prescriptions     Pending Prescriptions Disp Refills    pantoprazole (PROTONIX) 40 MG EC tablet [Pharmacy Med Name: PANTOPRAZOLE SOD DR 40 MG TAB] 90 tablet 0     Sig: TAKE 1 TABLET BY MOUTH DAILY      Last office visit with prescribing clinician: 11/18/2024   Last telemedicine visit with prescribing clinician: Visit date not found   Next office visit with prescribing clinician: 4/25/2025                         Would you like a call back once the refill request has been completed: [] Yes [] No    If the office needs to give you a call back, can they leave a voicemail: [] Yes [] No    Patricia Ayala MA  03/31/25, 10:21 EDT

## 2025-04-08 ENCOUNTER — TELEPHONE (OUTPATIENT)
Dept: INTERNAL MEDICINE | Facility: CLINIC | Age: 65
End: 2025-04-08
Payer: COMMERCIAL

## 2025-04-15 DIAGNOSIS — D50.0 IRON DEFICIENCY ANEMIA DUE TO CHRONIC BLOOD LOSS: ICD-10-CM

## 2025-04-15 DIAGNOSIS — E55.9 VITAMIN D DEFICIENCY: ICD-10-CM

## 2025-04-15 DIAGNOSIS — E53.8 VITAMIN B 12 DEFICIENCY: ICD-10-CM

## 2025-04-15 DIAGNOSIS — K21.00 GASTROESOPHAGEAL REFLUX DISEASE WITH ESOPHAGITIS WITHOUT HEMORRHAGE: ICD-10-CM

## 2025-04-15 DIAGNOSIS — E78.5 HYPERLIPIDEMIA LDL GOAL <100: Primary | ICD-10-CM

## 2025-04-23 ENCOUNTER — OFFICE VISIT (OUTPATIENT)
Dept: GASTROENTEROLOGY | Facility: CLINIC | Age: 65
End: 2025-04-23
Payer: COMMERCIAL

## 2025-04-23 VITALS
HEIGHT: 61 IN | SYSTOLIC BLOOD PRESSURE: 126 MMHG | WEIGHT: 196.8 LBS | BODY MASS INDEX: 37.16 KG/M2 | DIASTOLIC BLOOD PRESSURE: 86 MMHG

## 2025-04-23 DIAGNOSIS — R13.19 ESOPHAGEAL DYSPHAGIA: ICD-10-CM

## 2025-04-23 DIAGNOSIS — K21.9 GASTROESOPHAGEAL REFLUX DISEASE, UNSPECIFIED WHETHER ESOPHAGITIS PRESENT: Primary | ICD-10-CM

## 2025-04-23 RX ORDER — PANTOPRAZOLE SODIUM 40 MG/1
40 TABLET, DELAYED RELEASE ORAL DAILY
Qty: 90 TABLET | Refills: 3 | Status: SHIPPED | OUTPATIENT
Start: 2025-04-23

## 2025-04-23 NOTE — PROGRESS NOTES
Patient or patient representative verbalized consent for the use of Ambient Listening during the visit with  HECTOR Rodriguez for chart documentation. 4/23/2025  11:53 EDT    Chief Complaint   Patient presents with    Hiatal Hernia    Peptic Stricture     Gastritis          Patient is a 64 y.o. who presents to the office for follow-up evaluation as a new patient to me.  Last in office visit completed on 10/23/2024 with Dr. Ayala patient has a significant past medical history of GERD and chronic gastritis with peptic stricture and colon polyps.    7/2024 EGD:   Hiatal hernia  Peptic stricture of esophagus s/p dilation to 20 mm  Gastritis endoscopically suspicious for H. pylori  Path: Tissue sampling negative for H. Pylori  H. Pylori Antigen, Stool - Stool, Per Rectum (10/23/2024 11:33) -negative    7/2024 Colonoscopy- unremarkable; repeat 7/2034 8/2018- (2) subcentimeter tubular adenomas    Past Surgical History   Hysterectomy    Social History  Denies tobacco or ilicit drug use   Consumes 3 beers daily    Family History  Liver cancer in brother  Denies known family history of colon cancer, colon polyps, or IBD    History of Present Illness    The patient reports overall well-being with no adverse reactions to daily administration of pantoprazole 40 mg.   The patient notes persistent weight gain and early satiety, without associated nausea or vomiting.  Denies symptoms affecting completion of daily activities.  Denies dysphagia since undergoing dilation.    There is no evidence of melena, hematochezia, nocturnal bowel movements, or unintentional weight loss. Bowel movements occur regularly every other day and are perceived as complete.    Overall describes upper GI symptoms as well-controlled.      Current/Previous treatment for GERD  Current:  Pantoprazole 40 mg daily    Medications    Current Outpatient Medications:     estradiol (ESTRACE) 0.1 MG/GM vaginal cream, Apply 1gm to vaginal opening 2x week at  "bedtime., Disp: , Rfl:     ferrous sulfate 324 (65 Fe) MG tablet delayed-release EC tablet, Take 1 tablet by mouth Daily With Breakfast., Disp: , Rfl:     Garlic 2000 MG tablet delayed-release, Take 2,000 mg by mouth 3 (Three) Times a Day., Disp: , Rfl:     Multiple Vitamins-Minerals (Multi Adult Gummies) chewable tablet, Chew 1 tablet Daily., Disp: , Rfl:     pantoprazole (PROTONIX) 40 MG EC tablet, Take 1 tablet by mouth Daily., Disp: 90 tablet, Rfl: 3  Result Review :      Common labs          7/18/2024    08:20 12/11/2024    14:33 4/17/2025    08:39   Common Labs   Glucose 91   99    BUN 15   13    Creatinine 0.68   0.67    Sodium 138   142    Potassium 4.6   4.8    Chloride 103   104    Calcium 9.7   10.0    Albumin 4.6   4.4    Total Bilirubin 0.3   0.3    Alkaline Phosphatase 87   88    AST (SGOT) 19   22    ALT (SGPT) 14   14    WBC 4.28  7.1  5.09    Hemoglobin 11.6  12.2  11.2    Hematocrit 36.4  38.5  34.5    Platelets 407  426  436    Total Cholesterol 241   234    Triglycerides 76   81    HDL Cholesterol 96   79    LDL Cholesterol  132   141    Office Visit with Berto Ayala MD (10/23/2024)     Vital Signs:   /86 (BP Location: Left arm, Patient Position: Sitting, Cuff Size: Large Adult)   Ht 154.9 cm (60.98\")   Wt 89.3 kg (196 lb 12.8 oz)   BMI 37.20 kg/m²     Body mass index is 37.2 kg/m².      Physical Exam  Vitals reviewed.   Constitutional:       General: She is not in acute distress.     Appearance: She is well-developed.   HENT:      Head: Normocephalic and atraumatic.   Pulmonary:      Effort: Pulmonary effort is normal. No respiratory distress.   Skin:     General: Skin is dry.      Coloration: Skin is not pale.   Neurological:      Mental Status: She is alert and oriented to person, place, and time.   Psychiatric:         Thought Content: Thought content normal.       Assessment and Plan    Diagnoses and all orders for this visit:    1. Gastroesophageal reflux disease, unspecified " whether esophagitis present (Primary)  -     pantoprazole (PROTONIX) 40 MG EC tablet; Take 1 tablet by mouth Daily.  Dispense: 90 tablet; Refill: 3    2. Esophageal dysphagia       Assessment & Plan  1. Gastroesophageal reflux disease:  - Continue pantoprazole once daily  - Prescription sent to [facility] pharmacy in [place]  - Consume smaller, more frequent meals  - Increase physical activity to expedite gastrointestinal transit  - Monitor for worsening symptoms, significant nausea, or vomiting  - Inform  if symptoms worsen for potential gastric emptying study    Follow-up: 1 year  Patient is agreeable to the outlined above treatment plan.  Verbalizes understanding and will contact office for any new or worsening concerns.  All questions answered and support provided.  Patient Instructions   Ways to help reduce heartburn symptoms:    continue Protonix/Pantoprazole 40 mg daily prior to breakfast  Avoid eating late night snacks within 3 hours of going to bed  If you have increased abdominal body weight, lifestyle changes to improve weight can help with heartburn symtoms  If you use tobacco products, we recommend that you stop smoking including e-cigarettes  Research has proven that people with heartburn who use tobacco can reduce heartburn symptoms by 44% after they stop smoking.   Sleep on your left side  Sleeping with your head/upper body elevated with a wedge pillow or with the head of the bed inclined   Avoid foods that can trigger symptoms which may include:  citrus fruits  spicy foods,  Tomatoes and Red sauces   Chocolate  coffee/tea  caffeinated or carbonated beverages  Alcohol  High fat foods  peppermint      EMR Dragon/Transcription Disclaimer:  This document has been Dictated utilizing Dragon dictation.     Mari Lock, MSN, APRN, FNP-C   [facility]  Gastroenterology   [address] [place] [address]  [phone] office  [phone] fax

## 2025-04-23 NOTE — PATIENT INSTRUCTIONS
Ways to help reduce heartburn symptoms:    continue Protonix/Pantoprazole 40 mg daily prior to breakfast  Avoid eating late night snacks within 3 hours of going to bed  If you have increased abdominal body weight, lifestyle changes to improve weight can help with heartburn symtoms  If you use tobacco products, we recommend that you stop smoking including e-cigarettes  Research has proven that people with heartburn who use tobacco can reduce heartburn symptoms by 44% after they stop smoking.   Sleep on your left side  Sleeping with your head/upper body elevated with a wedge pillow or with the head of the bed inclined   Avoid foods that can trigger symptoms which may include:  citrus fruits  spicy foods,  Tomatoes and Red sauces   Chocolate  coffee/tea  caffeinated or carbonated beverages  Alcohol  High fat foods  peppermint

## 2025-04-25 ENCOUNTER — OFFICE VISIT (OUTPATIENT)
Dept: INTERNAL MEDICINE | Facility: CLINIC | Age: 65
End: 2025-04-25
Payer: COMMERCIAL

## 2025-04-25 VITALS
DIASTOLIC BLOOD PRESSURE: 82 MMHG | HEIGHT: 61 IN | WEIGHT: 194.8 LBS | OXYGEN SATURATION: 94 % | RESPIRATION RATE: 16 BRPM | TEMPERATURE: 97.6 F | HEART RATE: 78 BPM | SYSTOLIC BLOOD PRESSURE: 116 MMHG | BODY MASS INDEX: 36.78 KG/M2

## 2025-04-25 DIAGNOSIS — D50.0 IRON DEFICIENCY ANEMIA DUE TO CHRONIC BLOOD LOSS: Chronic | ICD-10-CM

## 2025-04-25 DIAGNOSIS — E55.9 VITAMIN D DEFICIENCY: ICD-10-CM

## 2025-04-25 DIAGNOSIS — Z13.6 ENCOUNTER FOR SCREENING FOR CORONARY ARTERY DISEASE: ICD-10-CM

## 2025-04-25 DIAGNOSIS — E53.8 VITAMIN B 12 DEFICIENCY: Chronic | ICD-10-CM

## 2025-04-25 DIAGNOSIS — E78.5 HYPERLIPIDEMIA LDL GOAL <100: Primary | ICD-10-CM

## 2025-04-25 DIAGNOSIS — K21.00 GASTROESOPHAGEAL REFLUX DISEASE WITH ESOPHAGITIS WITHOUT HEMORRHAGE: Chronic | ICD-10-CM

## 2025-04-25 DIAGNOSIS — L71.9 ACNE ROSACEA: ICD-10-CM

## 2025-04-25 PROCEDURE — 99214 OFFICE O/P EST MOD 30 MIN: CPT | Performed by: NURSE PRACTITIONER

## 2025-04-25 PROCEDURE — 1126F AMNT PAIN NOTED NONE PRSNT: CPT | Performed by: NURSE PRACTITIONER

## 2025-04-25 RX ORDER — METRONIDAZOLE 10 MG/G
GEL TOPICAL DAILY
Qty: 55 G | Refills: 6 | Status: SHIPPED | OUTPATIENT
Start: 2025-04-25

## 2025-04-25 RX ORDER — ATORVASTATIN CALCIUM 20 MG/1
20 TABLET, FILM COATED ORAL NIGHTLY
Qty: 90 TABLET | Refills: 1 | Status: SHIPPED | OUTPATIENT
Start: 2025-04-25

## 2025-04-25 NOTE — PROGRESS NOTES
Chief Complaint   Patient presents with    Obesity     Weight gain    Acne       Subjective     Kellen Henriquez is a 64 y.o. female being seen for a follow up appointment today regarding hyperlipidemia, ALO, Vit D and B def, and GERD.    She has hyperlipidemia and just bought a stationary bike. 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     EGD in 7/2024 had HH, peptic stricture of esophagus s/p dilation to 20 mm, gastritis (biopsied -- pathologist report findings were concerning for H Pylori infection despite negative biopsy results). H Pylori stool Negative Office Visit with Mari Lock APRN (04/23/2025)      She is on pantoprazole 40mg nce daily. She denies indigestion, difficulty swallowing,  or abd pain. She has breakthru once a month with red sauce and dairy. She takes tums as needed. She has an OTC iron pill and is taking 2 times weekly and just ran out of gummie Vit B12 daily.     History of Present Illness     No Known Allergies      Current Outpatient Medications:     estradiol (ESTRACE) 0.1 MG/GM vaginal cream, Apply 1gm to vaginal opening 2x week at bedtime., Disp: , Rfl:     ferrous sulfate 324 (65 Fe) MG tablet delayed-release EC tablet, Take 1 tablet by mouth Daily With Breakfast., Disp: , Rfl:     Garlic 2000 MG tablet delayed-release, Take 2,000 mg by mouth 3 (Three) Times a Day., Disp: , Rfl:     Multiple Vitamins-Minerals (Multi Adult Gummies) chewable tablet, Chew 1 tablet Daily., Disp: , Rfl:     pantoprazole (PROTONIX) 40 MG EC tablet, Take 1 tablet by mouth Daily., Disp: 90 tablet, Rfl: 3    The following portions of the patient's history were reviewed and updated as appropriate: allergies, current medications, past family history, past medical history, past social history, past surgical history, and problem list.    Review of Systems   Constitutional: Negative.    HENT: Negative.  Negative for congestion and dental problem.    Respiratory:  Positive for shortness of  breath. Negative for wheezing and stridor.    Cardiovascular:  Negative for chest pain, palpitations and leg swelling.   All other systems reviewed and are negative.      Assessment     Physical Exam  Vitals reviewed.   Constitutional:       Appearance: Normal appearance.   Cardiovascular:      Rate and Rhythm: Normal rate and regular rhythm.      Pulses: Normal pulses.      Heart sounds: Normal heart sounds.   Pulmonary:      Effort: Pulmonary effort is normal. No respiratory distress.      Breath sounds: Normal breath sounds. No stridor.   Skin:     General: Skin is warm and dry.      Findings: Rash (acne to face) present.   Neurological:      General: No focal deficit present.      Mental Status: She is alert and oriented to person, place, and time.   Psychiatric:         Mood and Affect: Mood normal.         Behavior: Behavior normal.         Thought Content: Thought content normal.         Plan     Her fasting labs were reviewed with the patient from last week.     Diagnoses and all orders for this visit:    1. Hyperlipidemia LDL goal <100 (Primary)  Comments:  Start Lipitro 20mg nightly  Orders:  -     atorvastatin (Lipitor) 20 MG tablet; Take 1 tablet by mouth Every Night.  Dispense: 90 tablet; Refill: 1  -     CBC & Differential; Future  -     Comprehensive Metabolic Panel; Future  -     Lipid Panel With / Chol / HDL Ratio; Future    2. Gastroesophageal reflux disease with esophagitis without hemorrhage  Comments:  On protonix 40mg nightly  Orders:  -     CBC & Differential; Future  -     Comprehensive Metabolic Panel; Future    3. Vitamin B 12 deficiency  Comments:  Resume MVI w iron  Orders:  -     CBC & Differential; Future  -     Vitamin B12; Future  -     Vitamin D,25-Hydroxy; Future    4. Vitamin D deficiency  Comments:  Resuem MVI w iron    5. Acne rosacea  -     metroNIDAZOLE (Metrogel) 1 % gel; Apply  topically to the appropriate area as directed Daily.  Dispense: 55 g; Refill: 6    6. Iron  deficiency anemia due to chronic blood loss  Comments:  Take iron 3 times weekly (increase from 2 times weekly)  Orders:  -     CBC & Differential; Future  -     Ferritin; Future  -     Iron Profile; Future    7. Encounter for screening for coronary artery disease  -     CT Cardiac Calcium Score Without Dye; Future        The 10-year ASCVD risk score (Otilia CAVANAUGH, et al., 2019) is: 3.4%    Values used to calculate the score:      Age: 64 years      Sex: Female      Is Non- : No      Diabetic: No      Tobacco smoker: No      Systolic Blood Pressure: 116 mmHg      Is BP treated: No      HDL Cholesterol: 79 mg/dL      Total Cholesterol: 234 mg/dL      Follow up in

## 2025-04-30 NOTE — PROGRESS NOTES
Chief Complaint   Patient presents with   • Mouth Lesions       Subjective   Kellen Henriquez is a 58 y.o. female is being seen for an acute appointment for mouth lesions. She is complaining of 2 oral lesions for 1 week. She is using Hydrogen peroxide OTC. This was precipitated by cold symptoms.     History of Present Illness     Current Outpatient Medications on File Prior to Visit   Medication Sig Dispense Refill   • [DISCONTINUED] aspirin 325 MG tablet Take  by mouth As Needed.     • [DISCONTINUED] B Complex Vitamins (VITAMIN B COMPLEX PO) Take  by mouth.     • [DISCONTINUED] gabapentin (NEURONTIN) 600 MG tablet Take 1 tablet by mouth 3 (Three) Times a Day. 90 tablet 1   • [DISCONTINUED] lidocaine (LIDODERM) 5 % Place 1 patch on the skin Daily. Remove & Discard patch within 12 hours or as directed by MD 30 patch 3   • [DISCONTINUED] naproxen (NAPROSYN) 500 MG tablet Take 1 tablet by mouth 2 (Two) Times a Day With Meals. 60 tablet 0     No current facility-administered medications on file prior to visit.        The following portions of the patient's history were reviewed and updated as appropriate: allergies, current medications, past family history, past medical history, past social history, past surgical history and problem list.    Review of Systems   Constitutional: Negative.    HENT: Positive for mouth sores. Negative for postnasal drip, rhinorrhea and sinus pain.    Eyes: Negative.    Respiratory: Negative.  Negative for shortness of breath, wheezing and stridor.    Cardiovascular: Negative.  Negative for chest pain, palpitations and leg swelling.   Gastrointestinal: Negative.    Endocrine: Negative.    Genitourinary: Negative.    Musculoskeletal: Negative.    Skin: Negative.    Allergic/Immunologic: Negative.    Neurological: Negative.    Psychiatric/Behavioral: Negative.        Objective   Physical Exam   Constitutional: She is oriented to person, place, and time. She appears well-developed and  well-nourished.   HENT:   Mouth/Throat: Mucous membranes are normal. Oral lesions (2 apthus ulcers under tongue) present.   Neck: Neck supple.   Cardiovascular: Normal rate and regular rhythm.   Neurological: She is alert and oriented to person, place, and time.   Psychiatric: She has a normal mood and affect. Her behavior is normal.   Vitals reviewed.      Assessment/Plan   Kellen was seen today for mouth lesions.    Diagnoses and all orders for this visit:    Ulcer aphthous oral  -     valACYclovir (VALTREX) 1000 MG tablet; Take 2 tablets by mouth 2 (Two) Times a Day.    Discussed causes of apthus ulcers.     Follow up as needed                [de-identified] : 67 y/o M, s/p left mastoidectomy and tympanoplasty.  At last visit had increased tinnitus  Partially debrided, started on Medrol dose pack and ofloxin gtts.  Continues to use Flonase.  Now notes no nasal congestion.  Tinnitus is becoming more intermittent, however, still very intense.   Feels hearing is a bit decreased.   [FreeTextEntry1] : 4/30/2025 still w/ tinnitus chr nasal congestion despite nasal spray and steroid tx

## 2025-05-05 ENCOUNTER — TELEPHONE (OUTPATIENT)
Dept: INTERNAL MEDICINE | Facility: CLINIC | Age: 65
End: 2025-05-05
Payer: COMMERCIAL

## 2025-05-05 DIAGNOSIS — Z12.31 SCREENING MAMMOGRAM FOR BREAST CANCER: Primary | ICD-10-CM

## 2025-05-05 NOTE — TELEPHONE ENCOUNTER
Caller: Kellen Henriquez    Relationship: Self    Best call back number: [phone]     What orders are you requesting (i.e. lab or imaging): MAMMOGRAM     In what timeframe would the patient need to come in: 05/12    Where will you receive your lab/imaging services:     Additional notes: PATIENT RECEIVED NOTIFICATION THAT ROUTINE MAMMOGRAM IS DUE

## 2025-05-12 ENCOUNTER — HOSPITAL ENCOUNTER (OUTPATIENT)
Dept: CT IMAGING | Facility: HOSPITAL | Age: 65
Discharge: HOME | End: 2025-05-12
Admitting: NURSE PRACTITIONER
Payer: COMMERCIAL

## 2025-05-12 DIAGNOSIS — Z13.6 ENCOUNTER FOR SCREENING FOR CORONARY ARTERY DISEASE: ICD-10-CM

## 2025-05-12 PROCEDURE — 75571 CT HRT W/O DYE W/CA TEST: CPT

## 2025-05-19 ENCOUNTER — TELEMEDICINE (OUTPATIENT)
Dept: INTERNAL MEDICINE | Facility: CLINIC | Age: 65
End: 2025-05-19
Payer: COMMERCIAL

## 2025-05-19 VITALS — BODY MASS INDEX: 36.63 KG/M2 | WEIGHT: 194 LBS | HEIGHT: 61 IN

## 2025-05-19 DIAGNOSIS — R07.9 CHEST PAIN IN ADULT: ICD-10-CM

## 2025-05-19 DIAGNOSIS — R93.1 AGATSTON CORONARY ARTERY CALCIUM SCORE GREATER THAN 400: Primary | ICD-10-CM

## 2025-05-19 DIAGNOSIS — E78.5 HYPERLIPIDEMIA LDL GOAL <70: ICD-10-CM

## 2025-05-19 PROCEDURE — 99213 OFFICE O/P EST LOW 20 MIN: CPT | Performed by: NURSE PRACTITIONER

## 2025-05-19 PROCEDURE — 1126F AMNT PAIN NOTED NONE PRSNT: CPT | Performed by: NURSE PRACTITIONER

## 2025-05-19 NOTE — PROGRESS NOTES
Chief Complaint   Patient presents with    Results     Discuss CT results       Subjective     Kellen Henriquez is a 64 y.o. female being seen for a follow up appointment today regarding Hyperlipidemia and CT calcium score. She has a screening CT calcium score completed 5- with a plaque burden of 537. She is not a smoker. She does reports chest pain occurring every 2 weeks, described as sharp pain that goes through to her back. She has indigestion with it. No SOA. It lasts up to 2 minutes and resolved on its own. She does not relate it to activity.     This patient has consented to a video visit due to Covid 19 pandemic in accordance with current CDC guidelines.  I am in clinic and she is at home.     History of Present Illness     No Known Allergies      Current Outpatient Medications:     atorvastatin (Lipitor) 20 MG tablet, Take 1 tablet by mouth Every Night., Disp: 90 tablet, Rfl: 1    estradiol (ESTRACE) 0.1 MG/GM vaginal cream, Apply 1gm to vaginal opening 2x week at bedtime., Disp: , Rfl:     ferrous sulfate 324 (65 Fe) MG tablet delayed-release EC tablet, Take 1 tablet by mouth Daily With Breakfast., Disp: , Rfl:     Garlic 2000 MG tablet delayed-release, Take 2,000 mg by mouth 3 (Three) Times a Day., Disp: , Rfl:     metroNIDAZOLE (Metrogel) 1 % gel, Apply  topically to the appropriate area as directed Daily., Disp: 55 g, Rfl: 6    Multiple Vitamins-Minerals (Multi Adult Gummies) chewable tablet, Chew 1 tablet Daily., Disp: , Rfl:     pantoprazole (PROTONIX) 40 MG EC tablet, Take 1 tablet by mouth Daily., Disp: 90 tablet, Rfl: 3    The following portions of the patient's history were reviewed and updated as appropriate: allergies, current medications, past family history, past medical history, past social history, past surgical history, and problem list.    Review of Systems   Respiratory:  Negative for shortness of breath, wheezing and stridor.    Cardiovascular:  Positive for chest pain. Negative  for leg swelling.   Gastrointestinal:  Positive for abdominal pain.       Assessment     Physical Exam  Vitals reviewed.   Constitutional:       Appearance: Normal appearance. She is obese.   Neurological:      Mental Status: She is alert and oriented to person, place, and time.   Psychiatric:         Mood and Affect: Mood normal.         Behavior: Behavior normal.         Thought Content: Thought content normal.         Plan     Her CT calcium score:  CT lag cardiac ca score wo dye with Flaquita Garcia APRN (05/12/2025)   Lipid Panel With / Chol / HDL Ratio (04/17/2025 08:39)     Diagnoses and all orders for this visit:    1. Agatston coronary artery calcium score greater than 400 (Primary)  -     Comprehensive Metabolic Panel; Future  -     Lipid Panel With / Chol / HDL Ratio; Future  -     Treadmill Stress test; Future    2. Hyperlipidemia LDL goal <70  -     Comprehensive Metabolic Panel; Future  -     Lipid Panel With / Chol / HDL Ratio; Future  -     Treadmill Stress test; Future    3. Chest pain in adult  -     Treadmill Stress test; Future    Stress testing needed due to high risk and reports of chest pain. May increase Lipitor to 40mg pending Results of cholesterol testing, LDL goal < 70.     Fasting labs in 2 months to repeat LDL

## 2025-05-30 ENCOUNTER — HOSPITAL ENCOUNTER (OUTPATIENT)
Dept: CARDIOLOGY | Facility: HOSPITAL | Age: 65
Discharge: HOME | End: 2025-05-30
Payer: COMMERCIAL

## 2025-05-30 DIAGNOSIS — R93.1 AGATSTON CORONARY ARTERY CALCIUM SCORE GREATER THAN 400: ICD-10-CM

## 2025-05-30 DIAGNOSIS — R07.9 CHEST PAIN IN ADULT: Primary | ICD-10-CM

## 2025-05-30 DIAGNOSIS — E78.5 HYPERLIPIDEMIA LDL GOAL <70: ICD-10-CM

## 2025-05-30 DIAGNOSIS — R07.9 CHEST PAIN IN ADULT: ICD-10-CM

## 2025-05-30 LAB
BH CV STRESS BP STAGE 1: NORMAL
BH CV STRESS DURATION MIN STAGE 1: 3
BH CV STRESS DURATION SEC STAGE 1: 0
BH CV STRESS GRADE STAGE 1: 10
BH CV STRESS HR STAGE 1: 133
BH CV STRESS METS STAGE 1: 4.6
BH CV STRESS PROTOCOL 1: NORMAL
BH CV STRESS RECOVERY BP: NORMAL MMHG
BH CV STRESS RECOVERY HR: 86 BPM
BH CV STRESS SPEED STAGE 1: 1.7
BH CV STRESS STAGE 1: 1
MAXIMAL PREDICTED HEART RATE: 157 BPM
PERCENT MAX PREDICTED HR: 85.99 %
STRESS BASELINE BP: NORMAL MMHG
STRESS BASELINE HR: 85 BPM
STRESS O2 SAT REST: 97 %
STRESS PERCENT HR: 101 %
STRESS POST ESTIMATED WORKLOAD: 4.6 METS
STRESS POST EXERCISE DUR MIN: 3 MIN
STRESS POST EXERCISE DUR SEC: 0 SEC
STRESS POST O2 SAT PEAK: 97 %
STRESS POST PEAK BP: NORMAL MMHG
STRESS POST PEAK HR: 135 BPM
STRESS TARGET HR: 133 BPM

## 2025-05-30 PROCEDURE — 93017 CV STRESS TEST TRACING ONLY: CPT

## 2025-06-19 ENCOUNTER — HOSPITAL ENCOUNTER (OUTPATIENT)
Dept: MAMMOGRAPHY | Facility: HOSPITAL | Age: 65
Discharge: HOME | End: 2025-06-19
Admitting: NURSE PRACTITIONER
Payer: COMMERCIAL

## 2025-06-19 DIAGNOSIS — Z12.31 SCREENING MAMMOGRAM FOR BREAST CANCER: ICD-10-CM

## 2025-06-19 PROCEDURE — 77063 BREAST TOMOSYNTHESIS BI: CPT

## 2025-06-19 PROCEDURE — 77063 BREAST TOMOSYNTHESIS BI: CPT | Performed by: RADIOLOGY

## 2025-06-19 PROCEDURE — 77067 SCR MAMMO BI INCL CAD: CPT | Performed by: RADIOLOGY

## 2025-06-19 PROCEDURE — 77067 SCR MAMMO BI INCL CAD: CPT

## 2025-07-02 ENCOUNTER — OFFICE VISIT (OUTPATIENT)
Dept: CARDIOLOGY | Facility: CLINIC | Age: 65
End: 2025-07-02
Payer: COMMERCIAL

## 2025-07-02 VITALS
HEIGHT: 61 IN | SYSTOLIC BLOOD PRESSURE: 136 MMHG | OXYGEN SATURATION: 93 % | DIASTOLIC BLOOD PRESSURE: 88 MMHG | BODY MASS INDEX: 36.42 KG/M2 | HEART RATE: 80 BPM | WEIGHT: 192.9 LBS

## 2025-07-02 DIAGNOSIS — R06.09 DYSPNEA ON EXERTION: ICD-10-CM

## 2025-07-02 DIAGNOSIS — I10 ESSENTIAL HYPERTENSION: Primary | ICD-10-CM

## 2025-07-02 DIAGNOSIS — R93.1 AGATSTON CORONARY ARTERY CALCIUM SCORE GREATER THAN 400: ICD-10-CM

## 2025-07-02 DIAGNOSIS — E78.5 HYPERLIPIDEMIA LDL GOAL <100: ICD-10-CM

## 2025-07-02 RX ORDER — ASPIRIN 81 MG/1
81 TABLET ORAL DAILY
Qty: 90 TABLET | Refills: 3 | Status: SHIPPED | OUTPATIENT
Start: 2025-07-02 | End: 2026-07-02

## 2025-07-02 RX ORDER — VALSARTAN AND HYDROCHLOROTHIAZIDE 80; 12.5 MG/1; MG/1
1 TABLET, FILM COATED ORAL DAILY
Qty: 90 TABLET | Refills: 3 | Status: SHIPPED | OUTPATIENT
Start: 2025-07-02 | End: 2026-07-02

## 2025-07-02 RX ORDER — ATORVASTATIN CALCIUM 20 MG/1
20 TABLET, FILM COATED ORAL NIGHTLY
Qty: 90 TABLET | Refills: 1 | Status: SHIPPED | OUTPATIENT
Start: 2025-07-02 | End: 2025-07-02 | Stop reason: SDUPTHER

## 2025-07-02 RX ORDER — ATORVASTATIN CALCIUM 40 MG/1
40 TABLET, FILM COATED ORAL NIGHTLY
Qty: 90 TABLET | Refills: 3 | Status: SHIPPED | OUTPATIENT
Start: 2025-07-02 | End: 2026-07-02

## 2025-07-02 NOTE — PROGRESS NOTES
[facility]    Subjective:     Encounter Date:07/02/25      Patient ID: Kellen Henriquez is a 64 y.o. female.    Chief Complaint:   Chief Complaint   Patient presents with    New patient      History of Present Illness    Kellen Henriquez is a 64 y.o. lady with a past medical history of hyperlipidemia who presents for further evaluation of abnormal coronary calcium score and dyspnea on exertion.    She reports that she has some intermittent chest pain episodes which occur mostly at night, in the setting of indigestion.  They did improve with Protonix.  She thinks it was indigestion.    She has concomitant dyspnea exertion which has been worsening over the last several years.  She does report that she has unintentionally gained weight and she is not that physically active, and she did undergo treadmill testing with her PCP which showed a markedly diminished exercise capacity and hypertensive response with stress.  She has had to have elevated blood pressures in the past and her PCP had wanted to start her medications with the patient was hesitant.  Now with the elevated calcium score she has been able to proceed with more aggressive medical regimens    She does not have any of the indigestion symptoms when she exerts herself.  She has predictable dyspnea on exertion that appears with rest.  She has reported a family history of coronary heart disease    Previous Cardiac Testing:  Coronary calcium score May 12, 2025:  Left main: 0  LAD: 318  Circumflex: 0  :  Total 537.    The following portions of the patient's history were reviewed and updated as appropriate: allergies, current medications, past family history, past medical history, past social history, past surgical history and problem list.    Past Medical History:   Diagnosis Date    GERD (gastroesophageal reflux disease)     H/O mammogram 2003    NORMAL    History of mammogram 11/20/2017     lagran    Hyperlipidemia     OAB  "(overactive bladder)     Plantar fasciitis     Scoliosis        Past Surgical History:   Procedure Laterality Date    BREAST BIOPSY Left     benign calcifications    BREAST SURGERY  2013    calcium buildup removed and biopsied     COLONOSCOPY N/A 08/13/2018    Procedure: COLONOSCOPY and polypectomy;  Surgeon: Pretty Meeks MD;  Location: [facility] OR;  Service: Gastroenterology    COLONOSCOPY N/A 7/31/2024    Procedure: COLONOSCOPY;  Surgeon: Berto Ayala MD;  Location: [facility] OR;  Service: Gastroenterology;  Laterality: N/A;  external hemorids, diverticulosis    ENDOSCOPY N/A 7/31/2024    Procedure: ESOPHAGOGASTRODUODENOSCOPY WITH DILATATION with biopsy;  Surgeon: Berto Ayala MD;  Location: [facility] OR;  Service: Gastroenterology;  Laterality: N/A;  gastric biopsy r/o H. Pylori, dilation for stricture, gastritis    HYSTERECTOMY  2023    MOUTH SURGERY      TOOTH EXTRACTION    PAP SMEAR  2002    NORMAL    TUBAL ABDOMINAL LIGATION             ECG 12 Lead    Date/Time: 7/2/2025 2:30 PM  Performed by: Jim Garcia MD    Authorized by: Jim Garcia MD  Comparison: not compared with previous ECG   Previous ECG: no previous ECG available  Rhythm: sinus rhythm  Rate: normal  Conduction: conduction normal  ST Segments: ST segments normal  T Waves: T waves normal  QRS axis: normal  Other: no other findings    Clinical impression: normal ECG             Objective:     Vitals:    07/02/25 1422   BP: 136/88   Pulse: 80   SpO2: 93%   Weight: 87.5 kg (192 lb 14.4 oz)   Height: 154.9 cm (61\")         Constitutional:       Appearance: Healthy appearance. Not in distress.   Neck:      Vascular: JVD normal.   Pulmonary:      Effort: Pulmonary effort is normal.      Breath sounds: Normal breath sounds.   Cardiovascular:      PMI at left midclavicular line. Normal rate. Regular rhythm. loud S2.       Murmurs: There is no murmur.   Pulses:     Intact distal pulses.   Edema:     Peripheral edema absent.   Skin:     General: " Skin is warm and dry.   Neurological:      General: No focal deficit present.      Mental Status: Alert, oriented to person, place, and time and oriented to person, place and time.   Psychiatric:         Mood and Affect: Mood and affect normal.         Lab Review:     Lipid Panel          7/18/2024    08:20 4/17/2025    08:39   Lipid Panel   Total Cholesterol 241  234    Triglycerides 76  81    HDL Cholesterol 96  79    VLDL Cholesterol 13  14    LDL Cholesterol  132  141      BUN   Date Value Ref Range Status   04/17/2025 13 8 - 23 mg/dL Final     Creatinine   Date Value Ref Range Status   04/17/2025 0.67 0.57 - 1.00 mg/dL Final     Potassium   Date Value Ref Range Status   04/17/2025 4.8 3.5 - 5.2 mmol/L Final     ALT (SGPT)   Date Value Ref Range Status   04/17/2025 14 1 - 33 U/L Final     AST (SGOT)   Date Value Ref Range Status   04/17/2025 22 1 - 32 U/L Final         Performed        Assessment:          Diagnosis Plan   1. Essential hypertension  ECG 12 Lead    Stress Test With Pet Myocardial Perfusion      2. Hyperlipidemia LDL goal <100  atorvastatin (Lipitor) 40 MG tablet    Start Lipitro 20mg nightly      3. Dyspnea on exertion  Stress Test With Pet Myocardial Perfusion    Adult Transthoracic Echo Complete W/ Cont if Necessary Per Protocol      4. Agatston coronary artery calcium score greater than 400  Stress Test With Pet Myocardial Perfusion    Adult Transthoracic Echo Complete W/ Cont if Necessary Per Protocol             Plan:         Chest pain: Sounds atypical.  She underwent treadmill stress testing which showed a markedly decreased exercise capacity, And a hypertensive response to stress.  Despite this, with 3 minutes of exercise, there is no evidence of ECG ischemia.  However, I do not think sufficiently exclude the presence of ischemic heart disease  Arrange for stress PET.  She is not able to walk on the treadmill, as previous treadmill testing was not-diagnostic due to diminished exercise  capacity.  SPECT imaging may be subject to breast attenuation artifact due to her body habitus so prefer PET.  Elevated coronary calcium score: High-percentile.  Score in the 500s.  Needs high intensity statin increase atorvastatin to 40  Goal LDL 55-70.  Aspirin 81  She has repeat labs from her PCP in August, this should be enough time for the new dose of statin to see the therapeutic effect  Prominent S2.  Body habitus could suggest diastolic CHF/pulmonary hypertension.  Check echo given her dyspnea.  Low threshold for sleep testing if RV enlargement is noted.  Hypertension: Not on any antihypertensives currently.  Her blood pressure at rest is mildly elevated however, her blood pressure shot through the roof with even minimal amounts of exercise  Start combination Diovan/HCT 80/12.5 for synergistic effect      RTC 6 months for reevaluation sooner if the stress test or echo show significant abnormalities.  Start antihypertensive and increased lipid-lowering therapy in the interim    Jim Garcia MD  [facility]  07/02/25  14:24 EDT       Current Outpatient Medications:     atorvastatin (Lipitor) 40 MG tablet, Take 1 tablet by mouth Every Night., Disp: 90 tablet, Rfl: 3    Cholecalciferol (VITAMIN D-3 PO), Take  by mouth., Disp: , Rfl:     Cyanocobalamin (VITAMIN B-12 PO), Take  by mouth., Disp: , Rfl:     estradiol (ESTRACE) 0.1 MG/GM vaginal cream, Apply 1gm to vaginal opening 2x week at bedtime., Disp: , Rfl:     ferrous sulfate 324 (65 Fe) MG tablet delayed-release EC tablet, Take 1 tablet by mouth Daily With Breakfast., Disp: , Rfl:     metroNIDAZOLE (Metrogel) 1 % gel, Apply  topically to the appropriate area as directed Daily., Disp: 55 g, Rfl: 6    Multiple Vitamins-Minerals (Multi Adult Gummies) chewable tablet, Chew 1 tablet Daily., Disp: , Rfl:     pantoprazole (PROTONIX) 40 MG EC tablet, Take 1 tablet by mouth Daily., Disp: 90 tablet, Rfl: 3    aspirin 81 MG EC tablet, Take 1 tablet by  mouth Daily., Disp: 90 tablet, Rfl: 3    Garlic 2000 MG tablet delayed-release, Take 2,000 mg by mouth 3 (Three) Times a Day., Disp: , Rfl:     valsartan-hydrochlorothiazide (Diovan HCT) 80-12.5 MG per tablet, Take 1 tablet by mouth Daily., Disp: 90 tablet, Rfl: 3         Return in about 6 months (around 1/2/2026).      Part of this note may be an electronic transcription/translation of spoken language to printed text using the Dragon Dictation System.

## 2025-07-02 NOTE — PATIENT INSTRUCTIONS
To help reduce the risk of future heart attacks and strokes we need to make sure we optimize cholesterol management as well as blood pressure control and this will help reduce the stress on the heart and help reduce the likelihood of future heart attacks or cardiovascular events.    To do this I like for you to increase the atorvastatin medication to 40 mg.  New pills should be the 40 mg tablets but you can take 2 of the 20 mg for now.  I would also encourage you to get a pill counter to help with the complex medications.    Next, your blood pressure was quite high on the treadmill test which can make you feel short of breath even if you have squeaky clean heart arteries.  To treat this I like to start a new medication that is a combination of a blood pressure medicine and a slight water pill called Diovan HCT.  This can be taken usually in the morning, this could be because some people pee extra with the medication, so the timing of this medication can be taken whenever is easier for you but if you take it at night it might worsen nocturia.    Make sure you take an aspirin 81 mg/day.    Lastly, to check your heart out make sure that those calcium plaques that are in your heart arteries are not causing symptoms of shortness of breath, we are going to obtain a stress test and heart ultrasound at our main hospital at [address] [place] we will give you more details of this but this perfusion stress test will make sure that there are no significant blockages that would cause shortness of breath episodes

## 2025-07-14 ENCOUNTER — TELEPHONE (OUTPATIENT)
Dept: CARDIOLOGY | Age: 65
End: 2025-07-14
Payer: COMMERCIAL

## 2025-07-15 ENCOUNTER — RESULTS FOLLOW-UP (OUTPATIENT)
Dept: CARDIOLOGY | Facility: CLINIC | Age: 65
End: 2025-07-15
Payer: COMMERCIAL

## 2025-07-15 ENCOUNTER — HOSPITAL ENCOUNTER (OUTPATIENT)
Dept: CARDIOLOGY | Facility: HOSPITAL | Age: 65
Discharge: HOME | End: 2025-07-15
Admitting: STUDENT IN AN ORGANIZED HEALTH CARE EDUCATION/TRAINING PROGRAM
Payer: COMMERCIAL

## 2025-07-15 VITALS
BODY MASS INDEX: 36.25 KG/M2 | HEIGHT: 61 IN | HEART RATE: 65 BPM | WEIGHT: 192 LBS | DIASTOLIC BLOOD PRESSURE: 90 MMHG | SYSTOLIC BLOOD PRESSURE: 136 MMHG | OXYGEN SATURATION: 94 %

## 2025-07-15 VITALS — BODY MASS INDEX: 36.25 KG/M2 | HEIGHT: 61 IN | WEIGHT: 192.02 LBS

## 2025-07-15 DIAGNOSIS — R93.1 AGATSTON CORONARY ARTERY CALCIUM SCORE GREATER THAN 400: ICD-10-CM

## 2025-07-15 DIAGNOSIS — R06.09 DYSPNEA ON EXERTION: ICD-10-CM

## 2025-07-15 DIAGNOSIS — I10 ESSENTIAL HYPERTENSION: ICD-10-CM

## 2025-07-15 LAB
AORTIC ARCH: 3 CM
AORTIC DIMENSIONLESS INDEX: 0.7 (DI)
ASCENDING AORTA: 3.1 CM
AV MEAN PRESS GRAD SYS DOP V1V2: 4 MMHG
AV VMAX SYS DOP: 132 CM/SEC
BH CV ECHO MEAS - ACS: 2.24 CM
BH CV ECHO MEAS - AI P1/2T: 1542 MSEC
BH CV ECHO MEAS - AO MAX PG: 7 MMHG
BH CV ECHO MEAS - AO ROOT DIAM: 3.2 CM
BH CV ECHO MEAS - AO V2 VTI: 27.1 CM
BH CV ECHO MEAS - AVA(I,D): 2.18 CM2
BH CV ECHO MEAS - EDV(CUBED): 70.4 ML
BH CV ECHO MEAS - EDV(MOD-SP2): 77 ML
BH CV ECHO MEAS - EDV(MOD-SP4): 76 ML
BH CV ECHO MEAS - EF(MOD-SP2): 67.5 %
BH CV ECHO MEAS - EF(MOD-SP4): 71.1 %
BH CV ECHO MEAS - ESV(CUBED): 19.1 ML
BH CV ECHO MEAS - ESV(MOD-SP2): 25 ML
BH CV ECHO MEAS - ESV(MOD-SP4): 22 ML
BH CV ECHO MEAS - FS: 35.3 %
BH CV ECHO MEAS - IVS/LVPW: 0.84 CM
BH CV ECHO MEAS - IVSD: 0.8 CM
BH CV ECHO MEAS - LAT PEAK E' VEL: 7.4 CM/SEC
BH CV ECHO MEAS - LV DIASTOLIC VOL/BSA (35-75): 40.9 CM2
BH CV ECHO MEAS - LV MASS(C)D: 111.6 GRAMS
BH CV ECHO MEAS - LV MAX PG: 2.9 MMHG
BH CV ECHO MEAS - LV MEAN PG: 2 MMHG
BH CV ECHO MEAS - LV SYSTOLIC VOL/BSA (12-30): 11.8 CM2
BH CV ECHO MEAS - LV V1 MAX: 85.3 CM/SEC
BH CV ECHO MEAS - LV V1 VTI: 18.9 CM
BH CV ECHO MEAS - LVIDD: 4.1 CM
BH CV ECHO MEAS - LVIDS: 2.7 CM
BH CV ECHO MEAS - LVOT AREA: 3.1 CM2
BH CV ECHO MEAS - LVOT DIAM: 2 CM
BH CV ECHO MEAS - LVPWD: 0.96 CM
BH CV ECHO MEAS - MED PEAK E' VEL: 6.5 CM/SEC
BH CV ECHO MEAS - MV A DUR: 0.11 SEC
BH CV ECHO MEAS - MV A MAX VEL: 96.8 CM/SEC
BH CV ECHO MEAS - MV DEC SLOPE: 275.9 CM/SEC2
BH CV ECHO MEAS - MV DEC TIME: 0.22 SEC
BH CV ECHO MEAS - MV E MAX VEL: 77.1 CM/SEC
BH CV ECHO MEAS - MV E/A: 0.8
BH CV ECHO MEAS - MV MAX PG: 4.3 MMHG
BH CV ECHO MEAS - MV MEAN PG: 1.44 MMHG
BH CV ECHO MEAS - MV P1/2T: 77.8 MSEC
BH CV ECHO MEAS - MV V2 VTI: 22.6 CM
BH CV ECHO MEAS - MVA(P1/2T): 2.8 CM2
BH CV ECHO MEAS - MVA(VTI): 2.6 CM2
BH CV ECHO MEAS - PA ACC TIME: 0.07 SEC
BH CV ECHO MEAS - PA V2 MAX: 134.4 CM/SEC
BH CV ECHO MEAS - PULM A REVS DUR: 0.13 SEC
BH CV ECHO MEAS - PULM A REVS VEL: 30.9 CM/SEC
BH CV ECHO MEAS - PULM DIAS VEL: 41.4 CM/SEC
BH CV ECHO MEAS - PULM S/D: 0.86
BH CV ECHO MEAS - PULM SYS VEL: 35.7 CM/SEC
BH CV ECHO MEAS - QP/QS: 0.72
BH CV ECHO MEAS - RAP SYSTOLE: 3 MMHG
BH CV ECHO MEAS - RV MAX PG: 2 MMHG
BH CV ECHO MEAS - RV V1 MAX: 70.7 CM/SEC
BH CV ECHO MEAS - RV V1 VTI: 13.5 CM
BH CV ECHO MEAS - RVOT DIAM: 2 CM
BH CV ECHO MEAS - RVSP: 22.3 MMHG
BH CV ECHO MEAS - SUP REN AO DIAM: 2.6 CM
BH CV ECHO MEAS - SV(LVOT): 59.1 ML
BH CV ECHO MEAS - SV(MOD-SP2): 52 ML
BH CV ECHO MEAS - SV(MOD-SP4): 54 ML
BH CV ECHO MEAS - SV(RVOT): 42.4 ML
BH CV ECHO MEAS - SVI(LVOT): 31.8 ML/M2
BH CV ECHO MEAS - SVI(MOD-SP2): 28 ML/M2
BH CV ECHO MEAS - SVI(MOD-SP4): 29.1 ML/M2
BH CV ECHO MEAS - TAPSE (>1.6): 1.98 CM
BH CV ECHO MEAS - TR MAX PG: 19.3 MMHG
BH CV ECHO MEAS - TR MAX VEL: 219.9 CM/SEC
BH CV ECHO MEASUREMENTS AVERAGE E/E' RATIO: 11.09
BH CV NUCLEAR PRIOR STUDY: 2
BH CV REST NUCLEAR ISOTOPE DOSE: 22.9 MCI
BH CV STRESS BP STAGE 1: NORMAL
BH CV STRESS COMMENTS STAGE 1: NORMAL
BH CV STRESS DOSE REGADENOSON STAGE 1: 0.4
BH CV STRESS DURATION MIN STAGE 1: 0
BH CV STRESS DURATION SEC STAGE 1: 10
BH CV STRESS HR STAGE 1: 102
BH CV STRESS NUCLEAR ISOTOPE DOSE: 22.9 MCI
BH CV STRESS PROTOCOL 1: NORMAL
BH CV STRESS RECOVERY BP: NORMAL MMHG
BH CV STRESS RECOVERY HR: 85 BPM
BH CV STRESS STAGE 1: 1
BH CV XLRA - RV BASE: 2.8 CM
BH CV XLRA - RV LENGTH: 6.8 CM
BH CV XLRA - RV MID: 2.46 CM
BH CV XLRA - TDI S': 9.9 CM/SEC
LEFT ATRIUM VOLUME INDEX: 13.4 ML/M2
LV EF BIPLANE MOD: 71.7 %
MAXIMAL PREDICTED HEART RATE: 157 BPM
PERCENT MAX PREDICTED HR: 64.97 %
SINUS: 2.8 CM
SPECT HRT GATED+EF W RNC IV: 77 %
STJ: 2.8 CM
STRESS BASELINE BP: NORMAL MMHG
STRESS BASELINE HR: 61 BPM
STRESS O2 SAT REST: 97 %
STRESS PERCENT HR: 76 %
STRESS POST EXERCISE DUR SEC: 10 SEC
STRESS POST O2 SAT PEAK: 99 %
STRESS POST PEAK BP: NORMAL MMHG
STRESS POST PEAK HR: 102 BPM
STRESS TARGET HR: 133 BPM

## 2025-07-15 PROCEDURE — 25510000001 PERFLUTREN 6.52 MG/ML SUSPENSION 2 ML VIAL: Performed by: STUDENT IN AN ORGANIZED HEALTH CARE EDUCATION/TRAINING PROGRAM

## 2025-07-15 PROCEDURE — A9555 RB82 RUBIDIUM: HCPCS | Performed by: STUDENT IN AN ORGANIZED HEALTH CARE EDUCATION/TRAINING PROGRAM

## 2025-07-15 PROCEDURE — 34310000005 RUBIDIUM CHLORIDE: Performed by: STUDENT IN AN ORGANIZED HEALTH CARE EDUCATION/TRAINING PROGRAM

## 2025-07-15 PROCEDURE — 78492 MYOCRD IMG PET MLT RST&STRS: CPT

## 2025-07-15 PROCEDURE — 93017 CV STRESS TEST TRACING ONLY: CPT

## 2025-07-15 PROCEDURE — 25010000002 REGADENOSON 0.4 MG/5ML SOLUTION: Performed by: STUDENT IN AN ORGANIZED HEALTH CARE EDUCATION/TRAINING PROGRAM

## 2025-07-15 PROCEDURE — 93306 TTE W/DOPPLER COMPLETE: CPT

## 2025-07-15 RX ORDER — REGADENOSON 0.08 MG/ML
0.4 INJECTION, SOLUTION INTRAVENOUS
Status: COMPLETED | OUTPATIENT
Start: 2025-07-15 | End: 2025-07-15

## 2025-07-15 RX ADMIN — REGADENOSON 0.4 MG: 0.08 INJECTION, SOLUTION INTRAVENOUS at 08:30

## 2025-07-15 RX ADMIN — PERFLUTREN 2 ML: 6.52 INJECTION, SUSPENSION INTRAVENOUS at 08:17

## (undated) DEVICE — Device: Brand: DEFENDO AIR/WATER/SUCTION AND BIOPSY VALVE

## (undated) DEVICE — LINER SURG CANSTR SXN S/RIGD 1500CC

## (undated) DEVICE — SPNG GZ WOVN 4X4IN 12PLY 10/BX STRL

## (undated) DEVICE — GOWN ISOL W/THUMB UNIV BLU BX/15

## (undated) DEVICE — BW-412T DISP COMBO CLEANING BRUSH: Brand: SINGLE USE COMBINATION CLEANING BRUSH

## (undated) DEVICE — ADAPT CLN BIOGUARD AIR/H2O DISP

## (undated) DEVICE — FRCP BX RADJAW4 NDL 2.8 240CM LG OG BX40

## (undated) DEVICE — SUCTION CANISTER, 3000CC,SAFELINER: Brand: DEROYAL

## (undated) DEVICE — THE BITE BLOCK MAXI, LATEX FREE STRAP IS USED TO PROTECT THE ENDOSCOPE INSERTION TUBE FROM BEING BITTEN BY THE PATIENT.

## (undated) DEVICE — Device

## (undated) DEVICE — DEV INFL BALN BIG60 W/GAUGE 60ML

## (undated) DEVICE — KT ORCA ORCAPOD DISP STRL

## (undated) DEVICE — SYR LL 3CC

## (undated) DEVICE — JACKT LAB KNIT COLR LG BLU

## (undated) DEVICE — ESOPHAGEAL/COLONIC WIREGUIDED BALLOON DILATATION CATHETER: Brand: CRE WIREGUIDED

## (undated) DEVICE — ENDO. PORT CONNECTOR W/VALVE FOR OLYMPUS® SCOPES: Brand: ERBE

## (undated) DEVICE — SOL IRR H2O BTL 1000ML STRL

## (undated) DEVICE — VIAL FORMALIN CAP 10P 40ML

## (undated) DEVICE — SYR LL W/SCALE/MARK 3ML STRL

## (undated) DEVICE — MASK,FACE,FLUID RESIST,SHLD,EARLOOP: Brand: MEDLINE

## (undated) DEVICE — GLV SURG SENSICARE MICRO PF LF 6 STRL